# Patient Record
Sex: FEMALE | Race: WHITE | NOT HISPANIC OR LATINO | Employment: OTHER | URBAN - METROPOLITAN AREA
[De-identification: names, ages, dates, MRNs, and addresses within clinical notes are randomized per-mention and may not be internally consistent; named-entity substitution may affect disease eponyms.]

---

## 2023-03-04 PROCEDURE — 99308 SBSQ NF CARE LOW MDM 20: CPT | Performed by: INTERNAL MEDICINE

## 2023-03-07 ENCOUNTER — NURSING HOME VISIT (OUTPATIENT)
Dept: POST ACUTE CARE | Facility: EXTERNAL LOCATION | Age: 78
End: 2023-03-07
Payer: MEDICARE

## 2023-03-07 DIAGNOSIS — K21.9 GASTROESOPHAGEAL REFLUX DISEASE, UNSPECIFIED WHETHER ESOPHAGITIS PRESENT: ICD-10-CM

## 2023-03-07 DIAGNOSIS — I10 HYPERTENSION, UNSPECIFIED TYPE: ICD-10-CM

## 2023-03-07 DIAGNOSIS — E56.9 VITAMIN DEFICIENCY: ICD-10-CM

## 2023-03-07 DIAGNOSIS — F02.80 ALZHEIMER'S DEMENTIA WITHOUT BEHAVIORAL DISTURBANCE, PSYCHOTIC DISTURBANCE, MOOD DISTURBANCE, OR ANXIETY, UNSPECIFIED DEMENTIA SEVERITY, UNSPECIFIED TIMING OF DEMENTIA ONSET (MULTI): ICD-10-CM

## 2023-03-07 DIAGNOSIS — N32.81 OAB (OVERACTIVE BLADDER): ICD-10-CM

## 2023-03-07 DIAGNOSIS — M62.81 MUSCLE WEAKNESS (GENERALIZED): Primary | ICD-10-CM

## 2023-03-07 DIAGNOSIS — N28.9 RENAL INSUFFICIENCY: ICD-10-CM

## 2023-03-07 DIAGNOSIS — G30.9 ALZHEIMER'S DEMENTIA WITHOUT BEHAVIORAL DISTURBANCE, PSYCHOTIC DISTURBANCE, MOOD DISTURBANCE, OR ANXIETY, UNSPECIFIED DEMENTIA SEVERITY, UNSPECIFIED TIMING OF DEMENTIA ONSET (MULTI): ICD-10-CM

## 2023-03-07 DIAGNOSIS — I31.9 PERICARDITIS, UNSPECIFIED CHRONICITY, UNSPECIFIED TYPE (HHS-HCC): ICD-10-CM

## 2023-03-07 DIAGNOSIS — D64.9 ANEMIA, UNSPECIFIED TYPE: ICD-10-CM

## 2023-03-07 PROCEDURE — 99309 SBSQ NF CARE MODERATE MDM 30: CPT | Performed by: NURSE PRACTITIONER

## 2023-03-07 NOTE — LETTER
*Provider Impression*  Patient is a 77 year old female who is seen today for management of multiple medical problems  #Weakness - PT/OT, acetaminophen 650mg q4h PRN, gabapentin 100mg TID, d/c ibuprofen, start APAP 650mg BID  #HTN / Pericarditis / Renal insufficiency - losartan 50mg daily, furosemide 40mg daily, KCl 20mEq daily, ibuprofen 600mg TID, f/u w/ cardiology,  check CBC and renal on 3/10  #OAB - oxybutynin XL 10mg daily  #GERD - omeprazole 20mg daily  #Dementia - memantine 10mg BID, donepezil 10mg daily  #Anemia / Vitamin deficiency - folic acid 800mcg daily, ferrous sulfate 325mg BID, ascorbic acid 250mg BID  Follow up as needed      *Chief Complaint*  PNA, pericarditis      *History of Present Illness*  Pt is a 78 y/o female who presented to [facility] ED from home. She was recently discharged from [facility] after being treated for pericarditis and Community Acquired Pneumonia with Levaquin. Patient reported she adhered to discharge plan of care, but had 7 episodes of diarrhea for several days. Labs showed Potassium 3.1 Magnesium 1.27. In ED Patient received Potassium and Magnesium Repeat labs in AM. CT of Abdomen showed New left lower lobe consolidation compatible with pneumonia. Suspected small left parapneumonic effusion In ED started on Levaquin. Her C. diff and stool PCR were negative. She was seen by cardiology for pericarditis. She was determined to be stable and d/c to [facility] for rehab.    Her renal was function lower. She had f/u w/ cardiollogy.    She is seen sitting up in her room today and denies any f/c, sweats, still having pains, occ cough, occ SOB, all c/w her pericarditis symptoms, no worse. No n/v, cosntipation, diarrhea, LUTS, edema, or any other new c/o presently.     Allergies - Cortisone, Amoxil, Inderal, Sulfa Antibiotics, Contrast Dye  PMH - bronchitis, pharyngitis, Alzheimer's dementia, Bell's palsy, HTN, cellulitis, GERD, CVA, HLD, hypothyroidism, morbid obesity, TIA,  vitamin D deficiency, breast cancer  PSH - mastectomy, excision of lesion, shoulder surgery, tubal ligation  FH - cancer, heart disease, HTN, pulmonary disease  SocHx - Former smoker, EtOH use      *Review of Systems*  All other systems reviewed are negative except as noted in the HPI     *Vitals*  Date: 3/7/23 - T: 97.5  P: 74  R: 18  BP: 132/74  SpO2: 99% on RA ; Date: 23 - Wt: 208     *Results/Data*  CBC - Date: 23 WBC: 9.3 HGB: 9.9 HCT: 30.4 PLT: 526 ;   BMP - Date: 3/2/23 Na: 146 K: 3.5 Cl: 100 CO2: 30 BUN: 37 Cr: 1.7 Glu: 90 Ca: 9.0 ;   LFT - Date: 23 AST: 25 ALT: 47 ALP: 55 TBili: 0.5 Ma.7 ;   Coags - Date: INR: PT: ;   Other - Date: 23 - TSH: 0.872 25-OH-D: 38.34     *Physical Exam*  Gen: (+) NAD, (+) well-appearing  HEENT: (+) normocephalic, (+) MMM  Neck: (+) supple  Lungs: (+) CTAB, (-) wheezes, (-) rales, (-) rhonchi  Heart: (+) RRR, (+) S1 S2, (-) murmurs  Pulses: (+) palpable  Abd: (+) soft, (+) NT, (+) ND, (+) BS+  Ext: (-) edema, (-) deformity  MSK: (-) joint swelling  Skin: (+) warm, (+) dry, (-) rash  Neuro: (+) follows commands, (-) tremor, (+) alert

## 2023-03-08 NOTE — PROGRESS NOTES
*Provider Impression*  Patient is a 77 year old female who is seen today for management of multiple medical problems  #Weakness - PT/OT, acetaminophen 650mg q4h PRN, gabapentin 100mg TID, d/c ibuprofen, start APAP 650mg BID  #HTN / Pericarditis / Renal insufficiency - losartan 50mg daily, furosemide 40mg daily, KCl 20mEq daily, ibuprofen 600mg TID, f/u w/ cardiology,  check CBC and renal on 3/10  #OAB - oxybutynin XL 10mg daily  #GERD - omeprazole 20mg daily  #Dementia - memantine 10mg BID, donepezil 10mg daily  #Anemia / Vitamin deficiency - folic acid 800mcg daily, ferrous sulfate 325mg BID, ascorbic acid 250mg BID  Follow up as needed      *Chief Complaint*  PNA, pericarditis      *History of Present Illness*  Pt is a 76 y/o female who presented to [facility] ED from home. She was recently discharged from [facility] after being treated for pericarditis and Community Acquired Pneumonia with Levaquin. Patient reported she adhered to discharge plan of care, but had 7 episodes of diarrhea for several days. Labs showed Potassium 3.1 Magnesium 1.27. In ED Patient received Potassium and Magnesium Repeat labs in AM. CT of Abdomen showed New left lower lobe consolidation compatible with pneumonia. Suspected small left parapneumonic effusion In ED started on Levaquin. Her C. diff and stool PCR were negative. She was seen by cardiology for pericarditis. She was determined to be stable and d/c to [facility] for rehab.    Her renal was function lower. She had f/u w/ cardiollogy.    She is seen sitting up in her room today and denies any f/c, sweats, still having pains, occ cough, occ SOB, all c/w her pericarditis symptoms, no worse. No n/v, cosntipation, diarrhea, LUTS, edema, or any other new c/o presently.     Allergies - Cortisone, Amoxil, Inderal, Sulfa Antibiotics, Contrast Dye  PMH - bronchitis, pharyngitis, Alzheimer's dementia, Bell's palsy, HTN, cellulitis, GERD, CVA, HLD, hypothyroidism, morbid obesity, TIA,  vitamin D deficiency, breast cancer  PSH - mastectomy, excision of lesion, shoulder surgery, tubal ligation  FH - cancer, heart disease, HTN, pulmonary disease  SocHx - Former smoker, EtOH use      *Review of Systems*  All other systems reviewed are negative except as noted in the HPI     *Vitals*  Date: 3/7/23 - T: 97.5  P: 74  R: 18  BP: 132/74  SpO2: 99% on RA ; Date: 23 - Wt: 208     *Results/Data*  CBC - Date: 23 WBC: 9.3 HGB: 9.9 HCT: 30.4 PLT: 526 ;   BMP - Date: 3/2/23 Na: 146 K: 3.5 Cl: 100 CO2: 30 BUN: 37 Cr: 1.7 Glu: 90 Ca: 9.0 ;   LFT - Date: 23 AST: 25 ALT: 47 ALP: 55 TBili: 0.5 Ma.7 ;   Coags - Date: INR: PT: ;   Other - Date: 23 - TSH: 0.872 25-OH-D: 38.34     *Physical Exam*  Gen: (+) NAD, (+) well-appearing  HEENT: (+) normocephalic, (+) MMM  Neck: (+) supple  Lungs: (+) CTAB, (-) wheezes, (-) rales, (-) rhonchi  Heart: (+) RRR, (+) S1 S2, (-) murmurs  Pulses: (+) palpable  Abd: (+) soft, (+) NT, (+) ND, (+) BS+  Ext: (-) edema, (-) deformity  MSK: (-) joint swelling  Skin: (+) warm, (+) dry, (-) rash  Neuro: (+) follows commands, (-) tremor, (+) alert

## 2023-03-12 PROCEDURE — 99309 SBSQ NF CARE MODERATE MDM 30: CPT | Performed by: INTERNAL MEDICINE

## 2023-03-14 ENCOUNTER — NURSING HOME VISIT (OUTPATIENT)
Dept: POST ACUTE CARE | Facility: EXTERNAL LOCATION | Age: 78
End: 2023-03-14
Payer: MEDICARE

## 2023-03-14 DIAGNOSIS — I31.9 PERICARDITIS, UNSPECIFIED CHRONICITY, UNSPECIFIED TYPE (HHS-HCC): ICD-10-CM

## 2023-03-14 DIAGNOSIS — N32.81 OAB (OVERACTIVE BLADDER): ICD-10-CM

## 2023-03-14 DIAGNOSIS — M62.81 MUSCLE WEAKNESS (GENERALIZED): Primary | ICD-10-CM

## 2023-03-14 DIAGNOSIS — K21.9 GASTROESOPHAGEAL REFLUX DISEASE, UNSPECIFIED WHETHER ESOPHAGITIS PRESENT: ICD-10-CM

## 2023-03-14 DIAGNOSIS — E56.9 VITAMIN DEFICIENCY: ICD-10-CM

## 2023-03-14 DIAGNOSIS — J18.9 HCAP (HEALTHCARE-ASSOCIATED PNEUMONIA): ICD-10-CM

## 2023-03-14 DIAGNOSIS — F02.80 ALZHEIMER'S DEMENTIA WITHOUT BEHAVIORAL DISTURBANCE, PSYCHOTIC DISTURBANCE, MOOD DISTURBANCE, OR ANXIETY, UNSPECIFIED DEMENTIA SEVERITY, UNSPECIFIED TIMING OF DEMENTIA ONSET (MULTI): ICD-10-CM

## 2023-03-14 DIAGNOSIS — N28.9 RENAL INSUFFICIENCY: ICD-10-CM

## 2023-03-14 DIAGNOSIS — D64.9 ANEMIA, UNSPECIFIED TYPE: ICD-10-CM

## 2023-03-14 DIAGNOSIS — G30.9 ALZHEIMER'S DEMENTIA WITHOUT BEHAVIORAL DISTURBANCE, PSYCHOTIC DISTURBANCE, MOOD DISTURBANCE, OR ANXIETY, UNSPECIFIED DEMENTIA SEVERITY, UNSPECIFIED TIMING OF DEMENTIA ONSET (MULTI): ICD-10-CM

## 2023-03-14 DIAGNOSIS — I10 HYPERTENSION, UNSPECIFIED TYPE: ICD-10-CM

## 2023-03-14 PROCEDURE — 99309 SBSQ NF CARE MODERATE MDM 30: CPT | Performed by: NURSE PRACTITIONER

## 2023-03-15 NOTE — PROGRESS NOTES
*Provider Impression*  Patient is a 77 year old female who is seen today for management of multiple medical problems  #Weakness - PT/OT, acetaminophen 650mg q4h PRN, gabapentin 100mg TID, APAP 650mg BID  #HCAP - doxycyline 100mg BID until 3/20  #HTN / Pericarditis / Renal insufficiency - losartan 50mg daily, furosemide 40mg BID, KCl 20mEq daily, f/u w/ cardiology  #OAB - oxybutynin XL 10mg daily  #GERD - omeprazole 20mg daily  #Dementia - memantine 10mg BID, donepezil 10mg daily  #Anemia / Vitamin deficiency - folic acid 800mcg daily, ferrous sulfate 325mg BID, ascorbic acid 250mg BID  Follow up as needed      *Chief Complaint*  PNA, pericarditis      *History of Present Illness*  Pt is a 76 y/o female who presented to [facility] ED from home. She was recently discharged from [facility] after being treated for pericarditis and Community Acquired Pneumonia with Levaquin. Patient reported she adhered to discharge plan of care, but had 7 episodes of diarrhea for several days. Labs showed Potassium 3.1 Magnesium 1.27. In ED Patient received Potassium and Magnesium Repeat labs in AM. CT of Abdomen showed New left lower lobe consolidation compatible with pneumonia. Suspected small left parapneumonic effusion In ED started on Levaquin. Her C. diff and stool PCR were negative. She was seen by cardiology for pericarditis. She was determined to be stable and d/c to [facility] for rehab.     Her CXR showed minimal left basilar atelectasis w/ effusion, and she was started on doxycycline and lasix increased.  Her labs were ok.     She is seen sitting up in her room today and reports therapy going ok, no pain, no f/c, sweats, CP, SOB, cough, n/v, constipation, diarrhea,  or any other new c/o presently.      Allergies - Cortisone, Amoxil, Inderal, Sulfa Antibiotics, Contrast Dye  PMH - bronchitis, pharyngitis, Alzheimer's dementia, Bell's palsy, HTN, cellulitis, GERD, CVA, HLD, hypothyroidism, morbid obesity, TIA, vitamin D  deficiency, breast cancer  PSH - mastectomy, excision of lesion, shoulder surgery, tubal ligation  FH - cancer, heart disease, HTN, pulmonary disease  SocHx - Former smoker, EtOH use      *Review of Systems*  All other systems reviewed are negative except as noted in the HPI      *Vitals*  Date: 3/14/23 - T: 97.7  P: 78  R: 18  BP: 128/62  SpO2: 98% on RA ; Date: 3/14/23 - Wt: 209      *Results/Data*  CBC - Date: 3/10/23 WBC: 8.0 HGB: 9.9 HCT: 30.2 PLT: 382 ;   BMP - Date: 3/10/23 Na: 145 K: 3.8 Cl: 102 CO2: 30 BUN: 24 Cr: 1.0 Glu: 84 Ca: 9.0  Alb: 2.9;   LFT - Date: 23 AST: 25 ALT: 47 ALP: 55 TBili: 0.5 Ma.7 ;   Coags - Date: INR: PT: ;   Other - Date: 23 - TSH: 0.872 25-OH-D: 38.34     *Physical Exam*  Gen: (+) NAD, (+) well-appearing  HEENT: (+) normocephalic, (+) MMM  Neck: (+) supple  Lungs: (+) CTAB, (-) wheezes, (-) rales, (-) rhonchi  Heart: (+) RRR, (+) S1 S2, (-) murmurs  Pulses: (+) palpable  Abd: (+) soft, (+) NT, (+) ND, (+) BS+  Ext: (-) edema, (-) deformity  MSK: (-) joint swelling  Skin: (+) warm, (+) dry, (-) rash  Neuro: (+) follows commands, (-) tremor, (+) alert

## 2023-04-20 LAB
ANION GAP IN SER/PLAS: NORMAL
CALCIUM (MG/DL) IN SER/PLAS: NORMAL
CARBON DIOXIDE, TOTAL (MMOL/L) IN SER/PLAS: NORMAL
CHLORIDE (MMOL/L) IN SER/PLAS: NORMAL
CREATININE (MG/DL) IN SER/PLAS: NORMAL
GFR FEMALE: NORMAL
GFR MALE: NORMAL
GLUCOSE (MG/DL) IN SER/PLAS: NORMAL
NATRIURETIC PEPTIDE B (PG/ML) IN SER/PLAS: NORMAL
POTASSIUM (MMOL/L) IN SER/PLAS: NORMAL
SODIUM (MMOL/L) IN SER/PLAS: NORMAL
UREA NITROGEN (MG/DL) IN SER/PLAS: NORMAL

## 2023-04-21 LAB
ALANINE AMINOTRANSFERASE (SGPT) (U/L) IN SER/PLAS: 8 U/L (ref 7–45)
ALBUMIN (G/DL) IN SER/PLAS: 4.3 G/DL (ref 3.4–5)
ALKALINE PHOSPHATASE (U/L) IN SER/PLAS: 45 U/L (ref 33–136)
ANION GAP IN SER/PLAS: 15 MMOL/L (ref 10–20)
ASPARTATE AMINOTRANSFERASE (SGOT) (U/L) IN SER/PLAS: 16 U/L (ref 9–39)
BILIRUBIN TOTAL (MG/DL) IN SER/PLAS: 0.4 MG/DL (ref 0–1.2)
CALCIUM (MG/DL) IN SER/PLAS: 9.8 MG/DL (ref 8.6–10.3)
CARBON DIOXIDE, TOTAL (MMOL/L) IN SER/PLAS: 28 MMOL/L (ref 21–32)
CHLORIDE (MMOL/L) IN SER/PLAS: 99 MMOL/L (ref 98–107)
CREATININE (MG/DL) IN SER/PLAS: 1.14 MG/DL (ref 0.5–1.05)
ERYTHROCYTE DISTRIBUTION WIDTH (RATIO) BY AUTOMATED COUNT: 15.7 % (ref 11.5–14.5)
ERYTHROCYTE MEAN CORPUSCULAR HEMOGLOBIN CONCENTRATION (G/DL) BY AUTOMATED: 30.1 G/DL (ref 32–36)
ERYTHROCYTE MEAN CORPUSCULAR VOLUME (FL) BY AUTOMATED COUNT: 99 FL (ref 80–100)
ERYTHROCYTES (10*6/UL) IN BLOOD BY AUTOMATED COUNT: 3.9 X10E12/L (ref 4–5.2)
GFR FEMALE: 49 ML/MIN/1.73M2
GLUCOSE (MG/DL) IN SER/PLAS: 102 MG/DL (ref 74–99)
HEMATOCRIT (%) IN BLOOD BY AUTOMATED COUNT: 38.5 % (ref 36–46)
HEMOGLOBIN (G/DL) IN BLOOD: 11.6 G/DL (ref 12–16)
LEUKOCYTES (10*3/UL) IN BLOOD BY AUTOMATED COUNT: 7.6 X10E9/L (ref 4.4–11.3)
NATRIURETIC PEPTIDE B (PG/ML) IN SER/PLAS: 26 PG/ML (ref 0–99)
PLATELETS (10*3/UL) IN BLOOD AUTOMATED COUNT: 367 X10E9/L (ref 150–450)
POTASSIUM (MMOL/L) IN SER/PLAS: 4.1 MMOL/L (ref 3.5–5.3)
PROTEIN TOTAL: 7.3 G/DL (ref 6.4–8.2)
SODIUM (MMOL/L) IN SER/PLAS: 138 MMOL/L (ref 136–145)
UREA NITROGEN (MG/DL) IN SER/PLAS: 30 MG/DL (ref 6–23)

## 2023-11-07 ENCOUNTER — TELEPHONE (OUTPATIENT)
Dept: PRIMARY CARE | Facility: CLINIC | Age: 78
End: 2023-11-07
Payer: MEDICARE

## 2023-11-07 PROBLEM — E55.9 VITAMIN D DEFICIENCY: Status: ACTIVE | Noted: 2023-11-07

## 2023-11-07 PROBLEM — N32.81 OAB (OVERACTIVE BLADDER): Status: ACTIVE | Noted: 2023-11-07

## 2023-11-07 PROBLEM — E03.9 ACQUIRED HYPOTHYROIDISM: Status: ACTIVE | Noted: 2023-11-07

## 2023-11-07 PROBLEM — F51.01 PRIMARY INSOMNIA: Status: ACTIVE | Noted: 2023-11-07

## 2023-11-07 PROBLEM — K21.9 GASTROESOPHAGEAL REFLUX DISEASE: Status: ACTIVE | Noted: 2023-11-07

## 2023-11-07 PROBLEM — E78.5 HYPERLIPIDEMIA: Status: ACTIVE | Noted: 2023-11-07

## 2023-11-07 PROBLEM — I73.9 PERIPHERAL VASCULAR DISEASE (CMS-HCC): Status: ACTIVE | Noted: 2023-11-07

## 2023-11-07 PROBLEM — D50.9 IRON DEFICIENCY ANEMIA: Status: ACTIVE | Noted: 2023-11-07

## 2023-11-07 PROBLEM — R41.82 ALTERED MENTAL STATUS: Status: ACTIVE | Noted: 2023-11-07

## 2023-11-07 PROBLEM — I10 PRIMARY HYPERTENSION: Status: ACTIVE | Noted: 2023-11-07

## 2023-11-07 PROBLEM — I89.0 LYMPHEDEMA: Status: ACTIVE | Noted: 2023-11-07

## 2023-11-07 PROBLEM — G62.9 NEUROPATHY: Status: ACTIVE | Noted: 2023-11-07

## 2023-11-07 PROBLEM — F01.50 VASCULAR DEMENTIA WITHOUT BEHAVIORAL DISTURBANCE (MULTI): Status: ACTIVE | Noted: 2023-11-07

## 2023-11-07 RX ORDER — ACETAMINOPHEN 325 MG/1
650 TABLET ORAL EVERY 4 HOURS PRN
COMMUNITY
Start: 2021-12-15 | End: 2023-11-08 | Stop reason: WASHOUT

## 2023-11-07 RX ORDER — OMEPRAZOLE 20 MG/1
20 CAPSULE, DELAYED RELEASE ORAL
COMMUNITY
Start: 2014-02-05 | End: 2024-02-19

## 2023-11-07 RX ORDER — MULTIVIT WITH MINERALS/HERBS
1 TABLET ORAL DAILY
COMMUNITY
Start: 2022-08-04

## 2023-11-07 RX ORDER — POTASSIUM CHLORIDE 20 MEQ/1
20 TABLET, EXTENDED RELEASE ORAL DAILY
COMMUNITY
Start: 2023-10-23 | End: 2024-06-10

## 2023-11-07 RX ORDER — FOLIC ACID 0.8 MG
0.8 TABLET ORAL DAILY
COMMUNITY
Start: 2014-01-21

## 2023-11-07 RX ORDER — BROMPHENIRAMINE MALEATE, DEXTROMETHORPHAN HBR, PHENYLEPHRINE HCL, DIPHENHYDRAMINE HCL, PHENYLEPHRINE HCL 0.52G
3 KIT ORAL DAILY
COMMUNITY
Start: 2022-06-28 | End: 2024-01-11 | Stop reason: SDUPTHER

## 2023-11-07 RX ORDER — FERROUS SULFATE 325(65) MG
65 TABLET ORAL
COMMUNITY
Start: 2022-08-04 | End: 2024-02-19

## 2023-11-07 RX ORDER — DONEPEZIL HYDROCHLORIDE 10 MG/1
10 TABLET, FILM COATED ORAL NIGHTLY
COMMUNITY
Start: 2019-03-26

## 2023-11-07 RX ORDER — ROSUVASTATIN CALCIUM 10 MG/1
10 TABLET, COATED ORAL DAILY
COMMUNITY
Start: 2016-09-12 | End: 2024-02-19

## 2023-11-07 RX ORDER — ACETAMINOPHEN 500 MG
2000 TABLET ORAL DAILY
COMMUNITY
Start: 2022-08-04

## 2023-11-07 RX ORDER — ASCORBIC ACID 500 MG
500 TABLET ORAL DAILY
COMMUNITY
Start: 2022-08-04

## 2023-11-07 RX ORDER — DEXTROMETHORPHAN HYDROBROMIDE, GUAIFENESIN 5; 100 MG/5ML; MG/5ML
1300 LIQUID ORAL 2 TIMES DAILY PRN
COMMUNITY
Start: 2022-04-15 | End: 2023-12-20 | Stop reason: SDUPTHER

## 2023-11-07 RX ORDER — FUROSEMIDE 40 MG/1
40 TABLET ORAL 2 TIMES DAILY
COMMUNITY
Start: 2021-04-16 | End: 2024-06-10

## 2023-11-07 RX ORDER — QUETIAPINE FUMARATE 25 MG/1
0.5 TABLET, FILM COATED ORAL NIGHTLY
COMMUNITY
Start: 2022-11-11

## 2023-11-07 RX ORDER — MEMANTINE HYDROCHLORIDE 10 MG/1
10 TABLET ORAL 2 TIMES DAILY
COMMUNITY
Start: 2019-03-26

## 2023-11-07 RX ORDER — LOSARTAN POTASSIUM 50 MG/1
50 TABLET ORAL DAILY
COMMUNITY
Start: 2021-12-15

## 2023-11-08 ENCOUNTER — OFFICE VISIT (OUTPATIENT)
Dept: PRIMARY CARE | Facility: CLINIC | Age: 78
End: 2023-11-08
Payer: MEDICARE

## 2023-11-08 VITALS
DIASTOLIC BLOOD PRESSURE: 78 MMHG | WEIGHT: 204 LBS | BODY MASS INDEX: 37.54 KG/M2 | RESPIRATION RATE: 16 BRPM | OXYGEN SATURATION: 97 % | TEMPERATURE: 97.1 F | HEART RATE: 72 BPM | HEIGHT: 62 IN | SYSTOLIC BLOOD PRESSURE: 116 MMHG

## 2023-11-08 DIAGNOSIS — K21.9 GASTROESOPHAGEAL REFLUX DISEASE WITHOUT ESOPHAGITIS: ICD-10-CM

## 2023-11-08 DIAGNOSIS — G30.0 MODERATE EARLY ONSET ALZHEIMER'S DEMENTIA WITH MOOD DISTURBANCE (MULTI): ICD-10-CM

## 2023-11-08 DIAGNOSIS — I89.0 LYMPHEDEMA: ICD-10-CM

## 2023-11-08 DIAGNOSIS — F02.B3 MODERATE EARLY ONSET ALZHEIMER'S DEMENTIA WITH MOOD DISTURBANCE (MULTI): ICD-10-CM

## 2023-11-08 DIAGNOSIS — I10 PRIMARY HYPERTENSION: Primary | ICD-10-CM

## 2023-11-08 DIAGNOSIS — R60.0 BILATERAL EDEMA OF LOWER EXTREMITY: ICD-10-CM

## 2023-11-08 DIAGNOSIS — E03.9 ACQUIRED HYPOTHYROIDISM: ICD-10-CM

## 2023-11-08 PROCEDURE — 1126F AMNT PAIN NOTED NONE PRSNT: CPT | Performed by: NURSE PRACTITIONER

## 2023-11-08 PROCEDURE — 3078F DIAST BP <80 MM HG: CPT | Performed by: NURSE PRACTITIONER

## 2023-11-08 PROCEDURE — 1159F MED LIST DOCD IN RCRD: CPT | Performed by: NURSE PRACTITIONER

## 2023-11-08 PROCEDURE — 1160F RVW MEDS BY RX/DR IN RCRD: CPT | Performed by: NURSE PRACTITIONER

## 2023-11-08 PROCEDURE — 3074F SYST BP LT 130 MM HG: CPT | Performed by: NURSE PRACTITIONER

## 2023-11-08 PROCEDURE — 99349 HOME/RES VST EST MOD MDM 40: CPT | Performed by: NURSE PRACTITIONER

## 2023-11-08 PROCEDURE — 1036F TOBACCO NON-USER: CPT | Performed by: NURSE PRACTITIONER

## 2023-11-08 ASSESSMENT — ENCOUNTER SYMPTOMS
UNEXPECTED WEIGHT CHANGE: 0
APPETITE CHANGE: 0
COUGH: 1
NERVOUS/ANXIOUS: 0
DEPRESSION: 0
LOSS OF SENSATION IN FEET: 0
ENDOCRINE COMMENTS: POSITIVE FOR THYROID DISEASE
NAUSEA: 0
PALPITATIONS: 0
CHILLS: 0
VOMITING: 0
SHORTNESS OF BREATH: 0
OCCASIONAL FEELINGS OF UNSTEADINESS: 0
DIARRHEA: 0
TROUBLE SWALLOWING: 0
WHEEZING: 0
FEVER: 0
CONSTIPATION: 0
ABDOMINAL PAIN: 0

## 2023-11-08 ASSESSMENT — PAIN SCALES - GENERAL: PAINLEVEL: 0-NO PAIN

## 2023-11-08 NOTE — PROGRESS NOTES
"Subjective   Patient ID: Aleta Feliz is a 78 y.o. female who presents for Follow-up (HTN, Lymphedema, function).    Visit for 77 y/o female seen today in private home, accompanied by son Reynaldo and caregiver Linda for routine follow up of multiple medical issues. Patient is sitting in w/c in kitchen this afternoon. She is alert, oriented to time and place, disoriented to time. She has h/o dementia and is an overall poor historian regarding her health. Her son and caregiver supplement HPI as needed. Patient lives with her son Reynaldo and has private duty caregivers from 10am-4pm daily and then from 6pm-9pm every evening. Her caregivers assist with all ADL and meal preparation. She recieves all medications from [facility] in pill packs. Her son/caregivers make sure that she takes her medications daily. Patient ambulates short distances with her walker. She denies any recent falls or injury. She denies appetite changes, signs of weight loss. Denies abdominal pain, nausea, vomiting. She denies any bowel or bladder concerns. She is incontinent of urine. She denies difficulty sleeping. H/o HTN. Pt does not routinely monitor her vitals. Her cardiologist is Dr. Edwards. She was scheduled to have a follow up last Friday but canceled the appt because she went to have her 2D echo and the tech was unable to complete the test as patient was not cooperative during the test and kept pushing the tech away as she states \"I was in pain\". h/o lymphedema. She does not use her lymphedema pumps. She reports her edema to be stable. She does try and elevate her legs throughout the day. Patients caregiver and son report that she has been doing very well. They deny any acute concerns today.     Home Visit:          Medically necessary due to: Illness or condition that results in activity lmitation or restriction that impacts the ability to leave home such as:, unsteady gait/poor balance, dementia/cognitive impairment         Current " Outpatient Medications:     acetaminophen (Tylenol 8 HOUR) 650 mg ER tablet, Take 2 tablets (1,300 mg) by mouth 2 times a day as needed., Disp: , Rfl:     ascorbic acid (Vitamin C) 500 mg tablet, Take 1 tablet (500 mg) by mouth once daily., Disp: , Rfl:     cholecalciferol (Vitamin D-3) 50 mcg (2,000 unit) capsule, Take 1 capsule (2,000 Units) by mouth early in the morning.., Disp: , Rfl:     cranberry fruit concentrate (AZO CRANBERRY ORAL), Take 1 tablet by mouth once daily in the evening., Disp: , Rfl:     donepezil (Aricept) 10 mg tablet, Take 1 tablet (10 mg) by mouth once daily at bedtime., Disp: , Rfl:     ferrous sulfate 325 (65 Fe) MG tablet, Take 1 tablet (65 mg of iron) by mouth. Three times a week, Disp: , Rfl:     folic acid (Folvite) 800 mcg tablet, Take 1 tablet (0.8 mg) by mouth once daily., Disp: , Rfl:     furosemide (Lasix) 40 mg tablet, Take 1 tablet (40 mg) by mouth 2 times a day., Disp: , Rfl:     levothyroxine (Synthroid, Levoxyl) 50 mcg tablet, Take 1 tablet (50 mcg) by mouth once daily in the morning. Take before meals., Disp: , Rfl:     losartan (Cozaar) 50 mg tablet, Take 1 tablet (50 mg) by mouth once daily., Disp: , Rfl:     memantine (Namenda) 10 mg tablet, Take 1 tablet (10 mg) by mouth 2 times a day., Disp: , Rfl:     omeprazole (PriLOSEC) 20 mg DR capsule, Take 1 capsule (20 mg) by mouth once daily in the morning. Take before meals., Disp: , Rfl:     oxybutynin XL (Ditropan-XL) 10 mg 24 hr tablet, Take 1 tablet (10 mg) by mouth once every 24 hours., Disp: , Rfl:     potassium chloride CR 20 mEq ER tablet, Take 1 tablet (20 mEq) by mouth once daily. With food, Disp: , Rfl:     psyllium (Metamucil) 0.52 gram capsule, Take 3 capsules (1.56 g) by mouth once daily. With 8 ounces of liquid, Disp: , Rfl:     QUEtiapine (SEROquel) 25 mg tablet, Take 0.5 tablets (12.5 mg) by mouth once daily at bedtime., Disp: , Rfl:     rosuvastatin (Crestor) 10 mg tablet, Take 1 tablet (10 mg) by mouth once  "daily., Disp: , Rfl:     vitamin B complex (Vitamins B Complex) tablet, Take 1 tablet by mouth once daily., Disp: , Rfl:      Review of Systems   Constitutional:  Negative for appetite change, chills, fever and unexpected weight change.   HENT:  Negative for hearing loss and trouble swallowing.    Respiratory:  Positive for cough (in the mornings). Negative for shortness of breath and wheezing.    Cardiovascular:  Positive for leg swelling. Negative for chest pain and palpitations.   Gastrointestinal:  Negative for abdominal pain, constipation, diarrhea, nausea and vomiting.   Endocrine:        Positive for thyroid disease   Genitourinary:         Positive for overactive bladder, urinary incontinence   Musculoskeletal:  Positive for gait problem.   Neurological:         Positive for memory loss   Psychiatric/Behavioral:  The patient is not nervous/anxious.      Objective   /78 (BP Location: Left arm, Patient Position: Sitting, BP Cuff Size: Large adult)   Pulse 72   Temp 36.2 °C (97.1 °F) (Temporal)   Resp 16   Ht 1.575 m (5' 2\")   Wt 92.5 kg (204 lb)   SpO2 97%   BMI 37.31 kg/m²     Physical Exam  Constitutional:       General: She is not in acute distress.     Appearance: She is obese.      Comments: Sitting in w/c with legs dependent   HENT:      Head: Normocephalic and atraumatic.      Nose: Nose normal.      Mouth/Throat:      Mouth: Mucous membranes are moist.      Pharynx: Oropharynx is clear.   Eyes:      Pupils: Pupils are equal, round, and reactive to light.      Comments: Wearing glasses    Cardiovascular:      Rate and Rhythm: Normal rate and regular rhythm.      Pulses: Normal pulses.      Heart sounds: Normal heart sounds. No murmur heard.     No friction rub. No gallop.      Comments: Trace edema bilaterally, non pitting  Pulmonary:      Effort: Pulmonary effort is normal. No respiratory distress.      Breath sounds: Normal breath sounds. No wheezing, rhonchi or rales.   Abdominal:      " General: Bowel sounds are normal. There is no distension.      Palpations: Abdomen is soft.      Tenderness: There is no abdominal tenderness.   Musculoskeletal:      Cervical back: Neck supple.      Comments: Ambulatory with walker   Skin:     General: Skin is warm and dry.      Capillary Refill: Capillary refill takes less than 2 seconds.      Comments: Stasis dermatitis bilaterally   Neurological:      General: No focal deficit present.      Mental Status: She is alert. Mental status is at baseline.      Gait: Gait abnormal.      Comments: Disoriented to time   Psychiatric:         Mood and Affect: Mood normal.         Behavior: Behavior normal.       Assessment/Plan   Diagnoses and all orders for this visit:  Primary hypertension  Comments:  chronic, vitals stable, continue Losartan  Bilateral edema of lower extremity  Comments:  chronic, stable, continue Furosemide  Lymphedema  Comments:  chronic, stable, recommend routine use of lymphedema pumps  Gastroesophageal reflux disease without esophagitis  Comments:  chronic, stable, continue with Omeprazole  Acquired hypothyroidism  Comments:  chronic, stable, continue Levothyroxine  Moderate early onset Alzheimer's dementia with mood disturbance (CMS/HCC)  Comments:  chronic, memory impairment noted. Continue Namenda, Aricept. Continue Seroquel for behaviors.       Theresa Sanders, APRN-CNP

## 2023-11-09 PROBLEM — I44.7 LEFT BUNDLE-BRANCH BLOCK: Status: ACTIVE | Noted: 2023-11-09

## 2023-11-09 PROBLEM — L03.116 CELLULITIS OF LEFT LOWER EXTREMITY WITHOUT FOOT: Status: ACTIVE | Noted: 2023-11-09

## 2023-11-09 PROBLEM — R73.9 HYPERGLYCEMIA: Status: ACTIVE | Noted: 2023-11-09

## 2023-11-09 PROBLEM — F02.80 ALZHEIMER'S DEMENTIA (MULTI): Status: ACTIVE | Noted: 2023-11-09

## 2023-11-09 PROBLEM — K59.00 CONSTIPATION: Status: ACTIVE | Noted: 2023-11-09

## 2023-11-09 PROBLEM — R60.0 BILATERAL EDEMA OF LOWER EXTREMITY: Status: ACTIVE | Noted: 2023-11-09

## 2023-11-09 PROBLEM — Z86.73 HISTORY OF STROKE: Status: ACTIVE | Noted: 2023-11-09

## 2023-11-09 PROBLEM — G30.9 ALZHEIMER'S DEMENTIA (MULTI): Status: ACTIVE | Noted: 2023-11-09

## 2023-11-09 PROBLEM — M81.0 AGE RELATED OSTEOPOROSIS: Status: ACTIVE | Noted: 2023-11-09

## 2023-11-09 PROBLEM — F32.A DEPRESSION: Status: ACTIVE | Noted: 2023-11-09

## 2023-11-09 PROBLEM — G45.9 TIA (TRANSIENT ISCHEMIC ATTACK): Status: ACTIVE | Noted: 2023-11-09

## 2023-11-09 PROBLEM — R19.5 FECAL OCCULT BLOOD TEST POSITIVE: Status: ACTIVE | Noted: 2023-11-09

## 2023-11-09 PROBLEM — R32 URINARY INCONTINENCE: Status: ACTIVE | Noted: 2023-11-09

## 2023-11-09 PROBLEM — E66.01 MORBID OBESITY (MULTI): Status: ACTIVE | Noted: 2023-11-09

## 2023-11-09 PROBLEM — G51.0 BELL'S PALSY: Status: ACTIVE | Noted: 2023-11-09

## 2023-11-22 DIAGNOSIS — K59.09 OTHER CONSTIPATION: Primary | ICD-10-CM

## 2023-11-27 RX ORDER — PSYLLIUM HUSK 0.4 G
CAPSULE ORAL
Qty: 84 CAPSULE | Refills: 3 | Status: SHIPPED | OUTPATIENT
Start: 2023-11-27 | End: 2024-03-19

## 2023-12-20 DIAGNOSIS — G62.9 NEUROPATHY: Primary | ICD-10-CM

## 2023-12-20 RX ORDER — DEXTROMETHORPHAN HYDROBROMIDE, GUAIFENESIN 5; 100 MG/5ML; MG/5ML
LIQUID ORAL
Qty: 112 TABLET | Refills: 1 | Status: SHIPPED | OUTPATIENT
Start: 2023-12-20 | End: 2024-02-19

## 2024-01-04 PROBLEM — J18.9 PNEUMONIA, UNSPECIFIED ORGANISM: Status: RESOLVED | Noted: 2023-02-22 | Resolved: 2024-01-04

## 2024-01-04 PROBLEM — N32.81 OVERACTIVE BLADDER: Status: ACTIVE | Noted: 2023-02-22

## 2024-01-04 PROBLEM — F02.80 ALZHEIMER'S DISEASE, UNSPECIFIED (MULTI): Status: ACTIVE | Noted: 2021-08-06

## 2024-01-04 PROBLEM — G30.9 ALZHEIMER'S DISEASE, UNSPECIFIED (MULTI): Status: ACTIVE | Noted: 2021-08-06

## 2024-01-04 PROBLEM — J20.9 ACUTE BRONCHITIS, UNSPECIFIED: Status: RESOLVED | Noted: 2023-02-22 | Resolved: 2024-01-04

## 2024-01-04 PROBLEM — E03.9 HYPOTHYROIDISM: Status: ACTIVE | Noted: 2021-08-06

## 2024-01-10 ENCOUNTER — TELEPHONE (OUTPATIENT)
Dept: PRIMARY CARE | Facility: CLINIC | Age: 79
End: 2024-01-10
Payer: MEDICARE

## 2024-01-11 ENCOUNTER — OFFICE VISIT (OUTPATIENT)
Dept: PRIMARY CARE | Facility: CLINIC | Age: 79
End: 2024-01-11
Payer: MEDICARE

## 2024-01-11 ENCOUNTER — APPOINTMENT (OUTPATIENT)
Dept: LAB | Facility: LAB | Age: 79
End: 2024-01-11
Payer: MEDICARE

## 2024-01-11 VITALS
WEIGHT: 204 LBS | TEMPERATURE: 97.3 F | OXYGEN SATURATION: 98 % | SYSTOLIC BLOOD PRESSURE: 124 MMHG | BODY MASS INDEX: 37.54 KG/M2 | HEIGHT: 62 IN | HEART RATE: 67 BPM | RESPIRATION RATE: 18 BRPM | DIASTOLIC BLOOD PRESSURE: 74 MMHG

## 2024-01-11 DIAGNOSIS — F02.80 ALZHEIMER'S DISEASE, UNSPECIFIED (MULTI): ICD-10-CM

## 2024-01-11 DIAGNOSIS — I89.0 LYMPHEDEMA: ICD-10-CM

## 2024-01-11 DIAGNOSIS — I10 PRIMARY HYPERTENSION: Primary | ICD-10-CM

## 2024-01-11 DIAGNOSIS — E03.9 HYPOTHYROIDISM, UNSPECIFIED TYPE: ICD-10-CM

## 2024-01-11 DIAGNOSIS — I73.9 PERIPHERAL VASCULAR DISEASE (CMS-HCC): ICD-10-CM

## 2024-01-11 DIAGNOSIS — D50.9 IRON DEFICIENCY ANEMIA, UNSPECIFIED IRON DEFICIENCY ANEMIA TYPE: ICD-10-CM

## 2024-01-11 DIAGNOSIS — G30.9 ALZHEIMER'S DISEASE, UNSPECIFIED (MULTI): ICD-10-CM

## 2024-01-11 LAB
ANION GAP SERPL CALC-SCNC: 13 MMOL/L
BUN SERPL-MCNC: 37 MG/DL (ref 8–25)
CALCIUM SERPL-MCNC: 9.5 MG/DL (ref 8.5–10.4)
CHLORIDE SERPL-SCNC: 97 MMOL/L (ref 97–107)
CO2 SERPL-SCNC: 25 MMOL/L (ref 24–31)
CREAT SERPL-MCNC: 1.1 MG/DL (ref 0.4–1.6)
EGFRCR SERPLBLD CKD-EPI 2021: 51 ML/MIN/1.73M*2
GLUCOSE SERPL-MCNC: 89 MG/DL (ref 65–99)
POTASSIUM SERPL-SCNC: 4.6 MMOL/L (ref 3.4–5.1)
SODIUM SERPL-SCNC: 135 MMOL/L (ref 133–145)
TSH SERPL DL<=0.05 MIU/L-ACNC: 1.93 MIU/L (ref 0.27–4.2)

## 2024-01-11 PROCEDURE — 1036F TOBACCO NON-USER: CPT | Performed by: NURSE PRACTITIONER

## 2024-01-11 PROCEDURE — 99349 HOME/RES VST EST MOD MDM 40: CPT | Performed by: NURSE PRACTITIONER

## 2024-01-11 PROCEDURE — 1159F MED LIST DOCD IN RCRD: CPT | Performed by: NURSE PRACTITIONER

## 2024-01-11 PROCEDURE — 3078F DIAST BP <80 MM HG: CPT | Performed by: NURSE PRACTITIONER

## 2024-01-11 PROCEDURE — 3074F SYST BP LT 130 MM HG: CPT | Performed by: NURSE PRACTITIONER

## 2024-01-11 PROCEDURE — 80048 BASIC METABOLIC PNL TOTAL CA: CPT

## 2024-01-11 PROCEDURE — 84443 ASSAY THYROID STIM HORMONE: CPT

## 2024-01-11 PROCEDURE — 1126F AMNT PAIN NOTED NONE PRSNT: CPT | Performed by: NURSE PRACTITIONER

## 2024-01-11 PROCEDURE — 36415 COLL VENOUS BLD VENIPUNCTURE: CPT

## 2024-01-11 ASSESSMENT — PAIN SCALES - GENERAL: PAINLEVEL: 0-NO PAIN

## 2024-01-11 NOTE — PROGRESS NOTES
Subjective   Patient ID: Aleta Feliz is a 78 y.o. female who presents for Follow-up (Routine follow up, labs).    Visit for 77 y/o female seen today in private home, accompanied by son Reynaldo and caregiver for routine follow up. Patient is resting in recliner. She is alert, able to answer simple questions but is an overall poor historian 2/2 dementia. Her son and caregiver supplement HPI. Patient lives with her son Reynaldo and has private duty caregivers daily from 10am-4pm and then from 6pm-9pm every evening. Her caregivers assist with all ADL and meal preparation. Patient remains ambulatory with walker and stand by assistance. She denies recent fall or injury. She receives all medications from [facility] in pill packs. Patient denies appetite changes or signs of weight loss. Denies abdominal pain, nausea, vomiting. Denies any bowel or bladder concerns. She is incontinent of urine. She denies difficulty sleeping. Her son does report that she does not like to go to bed at night and will get angry if her caregivers try to put her to bed too early. There have been no behavioral outbursts or sundowning concerns. She continues to follow with cardiologist Dr. Edwards and was seen in November. She did not end up having the 2D echo because she was unable to tolerate having the tech press on her stomach/chest. She denies headaches, dizziness, chest pain, palpitations. Edema to legs minimal. She has not used her lymphedema pumps. She denies any acute concerns today. Due for routine lab work.       Home Visit:          Medically necessary due to: Illness or condition that results in activity lmitation or restriction that impacts the ability to leave home such as:, unsteady gait/poor balance, dementia/cognitive impairment         Current Outpatient Medications:     acetaminophen (Tylenol 8 HOUR) 650 mg ER tablet, TAKE 2 TABLETS BY MOUTH EVERY TWELVE HOURS AS NEEDED, Disp: 112 tablet, Rfl: 1    ascorbic acid (Vitamin C)  500 mg tablet, Take 1 tablet (500 mg) by mouth once daily., Disp: , Rfl:     cholecalciferol (Vitamin D-3) 50 mcg (2,000 unit) capsule, Take 1 capsule (2,000 Units) by mouth early in the morning.., Disp: , Rfl:     cranberry fruit concentrate (AZO CRANBERRY ORAL), Take 1 tablet by mouth once daily in the evening., Disp: , Rfl:     donepezil (Aricept) 10 mg tablet, Take 1 tablet (10 mg) by mouth once daily at bedtime., Disp: , Rfl:     ferrous sulfate 325 (65 Fe) MG tablet, Take 1 tablet (65 mg of iron) by mouth. Three times a week, Disp: , Rfl:     folic acid (Folvite) 800 mcg tablet, Take 1 tablet (0.8 mg) by mouth once daily., Disp: , Rfl:     furosemide (Lasix) 40 mg tablet, Take 1 tablet (40 mg) by mouth 2 times a day., Disp: , Rfl:     levothyroxine (Synthroid, Levoxyl) 50 mcg tablet, Take 1 tablet (50 mcg) by mouth once daily in the morning. Take before meals., Disp: , Rfl:     losartan (Cozaar) 50 mg tablet, Take 1 tablet (50 mg) by mouth once daily., Disp: , Rfl:     memantine (Namenda) 10 mg tablet, Take 1 tablet (10 mg) by mouth 2 times a day., Disp: , Rfl:     omeprazole (PriLOSEC) 20 mg DR capsule, Take 1 capsule (20 mg) by mouth once daily in the morning. Take before meals., Disp: , Rfl:     oxybutynin XL (Ditropan-XL) 10 mg 24 hr tablet, Take 1 tablet (10 mg) by mouth once every 24 hours., Disp: , Rfl:     potassium chloride CR 20 mEq ER tablet, Take 1 tablet (20 mEq) by mouth once daily. With food, Disp: , Rfl:     psyllium (MetamuciL) 0.4 gram capsule, TAKE 3 CAPSULES WITH 8 OZ OF LIQUID DAILY, Disp: 84 capsule, Rfl: 3    QUEtiapine (SEROquel) 25 mg tablet, Take 0.5 tablets (12.5 mg) by mouth once daily at bedtime., Disp: , Rfl:     rosuvastatin (Crestor) 10 mg tablet, Take 1 tablet (10 mg) by mouth once daily., Disp: , Rfl:     vitamin B complex (Vitamins B Complex) tablet, Take 1 tablet by mouth once daily., Disp: , Rfl:      Review of Systems  Constitutional:  Negative for appetite change, chills,  "fever and unexpected weight change.   HENT: Negative for hearing loss and trouble swallowing.    Respiratory: Negative for shortness of breath and wheezing.    Cardiovascular:  Positive for edema. Negative for chest pain and palpitations.   Gastrointestinal:  Negative for abdominal pain, constipation, diarrhea, nausea and vomiting.   Endocrine: Positive for thyroid disease   Genitourinary: Positive for urinary incontinence   Musculoskeletal:  Positive for unsteady gait   Neurological: Positive for memory loss  Psychiatric/Behavioral:  The patient is not nervous/anxious.      Objective   /74 (BP Location: Left arm, Patient Position: Sitting, BP Cuff Size: Adult)   Pulse 67   Temp 36.3 °C (97.3 °F) (Temporal)   Resp 18   Ht 1.575 m (5' 2\")   Wt 92.5 kg (204 lb)   SpO2 98%   BMI 37.31 kg/m²     Physical Exam  Constitutional:       General: She is not in acute distress.     Appearance: She is overweight      Comments: Sitting in recliner, legs elevated   HENT:      Head: Normocephalic and atraumatic.      Nose: Nose normal.      Mouth/Throat:      Mouth: Mucous membranes are moist.      Pharynx: Oropharynx is clear.   Eyes:      Pupils: Pupils are equal, round, and reactive to light.      Comments: Wearing glasses   Cardiovascular:      Rate and Rhythm: Normal rate and regular rhythm.      Pulses: Normal pulses.      Heart sounds: Normal heart sounds. No murmur heard.     No friction rub. No gallop.      Comments: trace edema bilaterally, non pitting   Pulmonary:      Effort: Pulmonary effort is normal. No respiratory distress.      Breath sounds: Normal breath sounds. No wheezing, rhonchi or rales.   Abdominal:      General: Bowel sounds are normal. There is no distension.      Palpations: Abdomen is soft.      Tenderness: There is no abdominal tenderness.   Musculoskeletal:      Cervical back: Neck supple.      Comments: Ambulatory with walker    Skin:     General: Skin is warm and dry.      Capillary " Refill: Capillary refill takes less than 2 seconds.      Comments: stasis dermatitis bilaterally  Neurological:      General: No focal deficit present.      Mental Status: She is alert. Mental status is at baseline.      Gait: Abnormal       Comments: Alert to self, place. Disoriented to time.   Psychiatric:         Mood and Affect: Mood normal.         Behavior: Behavior normal.     Assessment/Plan   Diagnoses and all orders for this visit:  Primary hypertension  Comments:  chronic, vitals stable, continue Losartan  Orders:  -     Basic metabolic panel  Peripheral vascular disease (CMS/[facility])  Comments:  chronic, stable  Lymphedema  Comments:  chronic, edema to LE stable  Hypothyroidism, unspecified type  Comments:  chronic, continue levothyroxine  Orders:  -     Tsh With Reflex To Free T4 If Abnormal  Iron deficiency anemia, unspecified iron deficiency anemia type  Comments:  chronic, managed with ferrous sulfate  Alzheimer's disease, unspecified (CMS/[facility])  Comments:  chronic, progressive, memory impairment noted. Continue Donepezil, Namenda, Seroquel    Labs obtained in home- CBC, BMP, TSH, A1c. Did have some difficulty with lavender tube. Uncertain if lab will be able to run test. Overall, pt tolerated lab draw well.        Theresa Sanders, APRN-CNP

## 2024-02-19 DIAGNOSIS — G62.9 NEUROPATHY: ICD-10-CM

## 2024-02-19 DIAGNOSIS — K21.9 GASTROESOPHAGEAL REFLUX DISEASE WITHOUT ESOPHAGITIS: ICD-10-CM

## 2024-02-19 DIAGNOSIS — D50.9 IRON DEFICIENCY ANEMIA, UNSPECIFIED IRON DEFICIENCY ANEMIA TYPE: ICD-10-CM

## 2024-02-19 DIAGNOSIS — E78.5 HYPERLIPIDEMIA, UNSPECIFIED HYPERLIPIDEMIA TYPE: ICD-10-CM

## 2024-02-19 RX ORDER — ROSUVASTATIN CALCIUM 10 MG/1
10 TABLET, COATED ORAL DAILY
Qty: 30 TABLET | Refills: 11 | Status: SHIPPED | OUTPATIENT
Start: 2024-02-19

## 2024-02-19 RX ORDER — FERROUS SULFATE TAB 325 MG (65 MG ELEMENTAL FE) 325 (65 FE) MG
1 TAB ORAL 3 TIMES WEEKLY
Qty: 12 TABLET | Refills: 11 | Status: SHIPPED | OUTPATIENT
Start: 2024-02-19

## 2024-02-19 RX ORDER — DEXTROMETHORPHAN HYDROBROMIDE, GUAIFENESIN 5; 100 MG/5ML; MG/5ML
LIQUID ORAL
Qty: 120 TABLET | Refills: 11 | Status: SHIPPED | OUTPATIENT
Start: 2024-02-19

## 2024-02-19 RX ORDER — OMEPRAZOLE 20 MG/1
CAPSULE, DELAYED RELEASE ORAL
Qty: 30 CAPSULE | Refills: 11 | Status: SHIPPED | OUTPATIENT
Start: 2024-02-19

## 2024-03-08 ENCOUNTER — TELEPHONE (OUTPATIENT)
Dept: PRIMARY CARE | Facility: CLINIC | Age: 79
End: 2024-03-08
Payer: MEDICARE

## 2024-03-11 ENCOUNTER — OFFICE VISIT (OUTPATIENT)
Dept: PRIMARY CARE | Facility: CLINIC | Age: 79
End: 2024-03-11
Payer: MEDICARE

## 2024-03-11 ENCOUNTER — LAB (OUTPATIENT)
Dept: LAB | Facility: LAB | Age: 79
End: 2024-03-11
Payer: MEDICARE

## 2024-03-11 VITALS
WEIGHT: 190 LBS | TEMPERATURE: 97.3 F | OXYGEN SATURATION: 97 % | HEART RATE: 60 BPM | RESPIRATION RATE: 16 BRPM | SYSTOLIC BLOOD PRESSURE: 114 MMHG | BODY MASS INDEX: 34.96 KG/M2 | DIASTOLIC BLOOD PRESSURE: 60 MMHG | HEIGHT: 62 IN

## 2024-03-11 DIAGNOSIS — I73.9 PERIPHERAL VASCULAR DISEASE (CMS-HCC): Primary | ICD-10-CM

## 2024-03-11 DIAGNOSIS — I10 PRIMARY HYPERTENSION: ICD-10-CM

## 2024-03-11 DIAGNOSIS — E03.9 HYPOTHYROIDISM, UNSPECIFIED TYPE: ICD-10-CM

## 2024-03-11 DIAGNOSIS — Z00.00 HEALTHCARE MAINTENANCE: ICD-10-CM

## 2024-03-11 DIAGNOSIS — G30.9 ALZHEIMER'S DISEASE, UNSPECIFIED (MULTI): ICD-10-CM

## 2024-03-11 DIAGNOSIS — D50.9 IRON DEFICIENCY ANEMIA, UNSPECIFIED IRON DEFICIENCY ANEMIA TYPE: ICD-10-CM

## 2024-03-11 DIAGNOSIS — I89.0 LYMPHEDEMA: ICD-10-CM

## 2024-03-11 DIAGNOSIS — F02.80 ALZHEIMER'S DISEASE, UNSPECIFIED (MULTI): ICD-10-CM

## 2024-03-11 LAB
ERYTHROCYTE [DISTWIDTH] IN BLOOD BY AUTOMATED COUNT: 12.6 % (ref 11.5–14.5)
HCT VFR BLD AUTO: 38.5 % (ref 36–46)
HGB BLD-MCNC: 12.2 G/DL (ref 12–16)
MCH RBC QN AUTO: 31.5 PG (ref 26–34)
MCHC RBC AUTO-ENTMCNC: 31.7 G/DL (ref 32–36)
MCV RBC AUTO: 100 FL (ref 80–100)
NRBC BLD-RTO: 0 /100 WBCS (ref 0–0)
PLATELET # BLD AUTO: 300 X10*3/UL (ref 150–450)
RBC # BLD AUTO: 3.87 X10*6/UL (ref 4–5.2)
WBC # BLD AUTO: 8.3 X10*3/UL (ref 4.4–11.3)

## 2024-03-11 PROCEDURE — 36415 COLL VENOUS BLD VENIPUNCTURE: CPT

## 2024-03-11 PROCEDURE — 1159F MED LIST DOCD IN RCRD: CPT | Performed by: NURSE PRACTITIONER

## 2024-03-11 PROCEDURE — 99349 HOME/RES VST EST MOD MDM 40: CPT | Performed by: NURSE PRACTITIONER

## 2024-03-11 PROCEDURE — 1126F AMNT PAIN NOTED NONE PRSNT: CPT | Performed by: NURSE PRACTITIONER

## 2024-03-11 PROCEDURE — 3078F DIAST BP <80 MM HG: CPT | Performed by: NURSE PRACTITIONER

## 2024-03-11 PROCEDURE — 1036F TOBACCO NON-USER: CPT | Performed by: NURSE PRACTITIONER

## 2024-03-11 PROCEDURE — 85027 COMPLETE CBC AUTOMATED: CPT

## 2024-03-11 PROCEDURE — 83036 HEMOGLOBIN GLYCOSYLATED A1C: CPT

## 2024-03-11 PROCEDURE — 1160F RVW MEDS BY RX/DR IN RCRD: CPT | Performed by: NURSE PRACTITIONER

## 2024-03-11 PROCEDURE — 80048 BASIC METABOLIC PNL TOTAL CA: CPT

## 2024-03-11 PROCEDURE — 36415 COLL VENOUS BLD VENIPUNCTURE: CPT | Performed by: NURSE PRACTITIONER

## 2024-03-11 PROCEDURE — 3074F SYST BP LT 130 MM HG: CPT | Performed by: NURSE PRACTITIONER

## 2024-03-11 RX ORDER — AMMONIUM LACTATE 12 G/100G
1 LOTION TOPICAL AS NEEDED
Qty: 225 G | Refills: 1 | Status: SHIPPED | OUTPATIENT
Start: 2024-03-11

## 2024-03-11 RX ORDER — AMMONIUM LACTATE 12 G/100G
1 LOTION TOPICAL AS NEEDED
COMMUNITY
End: 2024-03-11 | Stop reason: SDUPTHER

## 2024-03-11 ASSESSMENT — PAIN SCALES - GENERAL: PAINLEVEL: 0-NO PAIN

## 2024-03-11 NOTE — PROGRESS NOTES
Subjective   Patient ID: Aleta Feliz is a 78 y.o. female who presents for Follow-up (Routine follow up, labs ).    Visit for 77 y/o female seen today in private home, accompanied by son Reynaldo and caregiver for routine follow up of chronic medical conditions. Patient is due to have labs today. She was seen in home on 1/11/24. Labs were obtained but the CBC/A1c clotted. Patient is sitting in recliner today watching TV. She is alert to self and place. She is able to answer simple questions but is an overall poor historian 2/2 dementia. Her son and caregiver provide most of the health history. Patient has caregivers in the home daily that assist with ADLs and meal preparation. Patient has had weight loss. Her caregiver reports that she is eating healthier foods and smaller portions. Pt denies any appetite changes, abdominal pain, nausea, vomiting. Denies any bowel or bladder concerns. She is incontinent of urine. Denies difficulty sleeping. Patient receives all medications through [facility] in pill packs. Her caregiver is requesting a refill of Ammonium Lactate lotion to go to local [facility]. Patient remains ambulatory with walker and stand by assistance. She denies recent fall or injury. She continues to follow with cardiologist Dr. Edwards. She denies headaches, dizziness, chest pain, palpitations. Edema to legs minimal. She has not used her lymphedema pumps. She denies any acute concerns today.      Home Visit:          Medically necessary due to: Illness or condition that results in activity lmitation or restriction that impacts the ability to leave home such as:, unsteady gait/poor balance, dementia/cognitive impairment         Current Outpatient Medications:     acetaminophen (Tylenol 8 HOUR) 650 mg ER tablet, TAKE 2 TABLETS BY MOUTH EVERY TWELVE HOURS AS NEEDED, Disp: 120 tablet, Rfl: 11    ammonium lactate (Lac-Hydrin) 12 % lotion, Apply 1 Application topically if needed for dry skin., Disp: 225 g,  Rfl: 1    ascorbic acid (Vitamin C) 500 mg tablet, Take 1 tablet (500 mg) by mouth once daily., Disp: , Rfl:     cholecalciferol (Vitamin D-3) 50 mcg (2,000 unit) capsule, Take 1 capsule (2,000 Units) by mouth early in the morning.., Disp: , Rfl:     cranberry fruit concentrate (AZO CRANBERRY ORAL), Take 1 tablet by mouth once daily in the evening., Disp: , Rfl:     donepezil (Aricept) 10 mg tablet, Take 1 tablet (10 mg) by mouth once daily at bedtime., Disp: , Rfl:     FeroSuL tablet, TAKE 1 TABLET BY MOUTH THREE TIMES WEEKLY, Disp: 12 tablet, Rfl: 11    folic acid (Folvite) 800 mcg tablet, Take 1 tablet (0.8 mg) by mouth once daily., Disp: , Rfl:     furosemide (Lasix) 40 mg tablet, Take 1 tablet (40 mg) by mouth 2 times a day., Disp: , Rfl:     levothyroxine (Synthroid, Levoxyl) 50 mcg tablet, Take 1 tablet (50 mcg) by mouth once daily in the morning. Take before meals., Disp: , Rfl:     losartan (Cozaar) 50 mg tablet, Take 1 tablet (50 mg) by mouth once daily., Disp: , Rfl:     memantine (Namenda) 10 mg tablet, Take 1 tablet (10 mg) by mouth 2 times a day., Disp: , Rfl:     omeprazole (PriLOSEC) 20 mg DR capsule, TAKE 1 CAPSULE BY MOUTH 30 MINUTE(S) BEFORE MORNING MEAL, Disp: 30 capsule, Rfl: 11    oxybutynin XL (Ditropan-XL) 10 mg 24 hr tablet, Take 1 tablet (10 mg) by mouth once every 24 hours., Disp: , Rfl:     potassium chloride CR 20 mEq ER tablet, Take 1 tablet (20 mEq) by mouth once daily. With food, Disp: , Rfl:     psyllium (MetamuciL) 0.4 gram capsule, TAKE 3 CAPSULES WITH 8 OZ OF LIQUID DAILY, Disp: 84 capsule, Rfl: 3    QUEtiapine (SEROquel) 25 mg tablet, Take 0.5 tablets (12.5 mg) by mouth once daily at bedtime., Disp: , Rfl:     rosuvastatin (Crestor) 10 mg tablet, TAKE 1 TABLET BY MOUTH DAILY, Disp: 30 tablet, Rfl: 11    vitamin B complex (Vitamins B Complex) tablet, Take 1 tablet by mouth once daily., Disp: , Rfl:      Review of Systems  Constitutional: Positive for weight loss. Negative for  "appetite change, chills, fever.  HENT: Negative for hearing loss and trouble swallowing.    Respiratory: Negative for shortness of breath and wheezing.    Cardiovascular: Positive for edema. Negative for chest pain and palpitations.   Gastrointestinal:  Negative for abdominal pain, constipation, diarrhea, nausea and vomiting.   Endocrine: Positive for thyroid disease   Genitourinary: Positive for urinary incontinence   Musculoskeletal:  Positive for unsteady gait   Neurological: Positive for memory loss  Psychiatric/Behavioral:  The patient is not nervous/anxious.      Objective   /60 (BP Location: Left arm, Patient Position: Sitting, BP Cuff Size: Adult)   Pulse 60   Temp 36.3 °C (97.3 °F) (Temporal)   Resp 16   Ht 1.575 m (5' 2\")   Wt 86.2 kg (190 lb)   SpO2 97%   BMI 34.75 kg/m²     Physical Exam  Constitutional:       General: She is not in acute distress.     Appearance: She is overweight      Comments: Alert, Sitting in recliner, legs elevated   HENT:      Head: Normocephalic and atraumatic.      Nose: Nose normal.      Mouth/Throat:      Mouth: Mucous membranes are moist.      Pharynx: Oropharynx is clear.   Eyes:      Pupils: Pupils are equal, round, and reactive to light.      Comments: Wearing glasses   Cardiovascular:      Rate and Rhythm: Normal rate and regular rhythm.      Pulses: Normal pulses.      Heart sounds: Normal heart sounds. No murmur heard.     No friction rub. No gallop.      Comments: trace edema bilaterally, non pitting   Pulmonary:      Effort: Pulmonary effort is normal. No respiratory distress.      Breath sounds: Normal breath sounds. No wheezing, rhonchi or rales.   Abdominal:      General: Bowel sounds are normal. There is no distension.      Palpations: Abdomen is soft.      Tenderness: There is no abdominal tenderness.   Musculoskeletal:      Cervical back: Neck supple.      Comments: Ambulatory with walker    Skin:     General: Skin is warm and dry.      Capillary " Refill: Capillary refill takes less than 2 seconds.      Comments: stasis dermatitis bilaterally  Neurological:      General: No focal deficit present.      Mental Status: She is alert. Mental status is at baseline.      Gait: Abnormal       Comments: Alert to self, place. Disoriented to time.   Psychiatric:         Mood and Affect: Mood normal.         Behavior: Behavior normal.     Lab Results   Component Value Date    WBC 8.3 03/11/2024    HGB 12.2 03/11/2024    HCT 38.5 03/11/2024     03/11/2024     03/11/2024        Chemistry    Lab Results   Component Value Date/Time     03/11/2024 1500    K 4.2 03/11/2024 1500    CL 99 03/11/2024 1500    CO2 26 03/11/2024 1500    BUN 39 (H) 03/11/2024 1500    CREATININE 1.10 (H) 03/11/2024 1500    Lab Results   Component Value Date/Time    CALCIUM 9.7 03/11/2024 1500    ALKPHOS 45 04/21/2023 1348    AST 16 04/21/2023 1348    ALT 8 04/21/2023 1348    BILITOT 0.4 04/21/2023 1348           Assessment/Plan   Diagnoses and all orders for this visit:  Peripheral vascular disease (CMS/HCC)  -     ammonium lactate (Lac-Hydrin) 12 % lotion; Apply 1 Application topically if needed for dry skin.  Lymphedema  -chronic, stable  -pt to use lymphedema pumps as needed  Iron deficiency anemia, unspecified iron deficiency anemia type  -     CBC; Future  -chronic, managed with ferrous sulfate   -will check CBC today   Hypothyroidism, unspecified type  -chronic, continue levothyroxine  Primary hypertension  -     Basic metabolic panel; Future  -chronic, vitals stable  -continue losartan  Alzheimer's disease, unspecified (CMS/HCC)  -chronic, progressive  -memory impairment noted  -continue donepezil, memantine  -continue [employer] [facility] maintenance  -     Hemoglobin A1c; Future    Routine labs obtained in home- CBC, BMP, A1c level- pt tolerated well      HANS Chadwick-CNP

## 2024-03-12 LAB
ANION GAP SERPL CALC-SCNC: 21 MMOL/L (ref 10–20)
BUN SERPL-MCNC: 39 MG/DL (ref 6–23)
CALCIUM SERPL-MCNC: 9.7 MG/DL (ref 8.6–10.3)
CHLORIDE SERPL-SCNC: 99 MMOL/L (ref 98–107)
CO2 SERPL-SCNC: 26 MMOL/L (ref 21–32)
CREAT SERPL-MCNC: 1.1 MG/DL (ref 0.5–1.05)
EGFRCR SERPLBLD CKD-EPI 2021: 51 ML/MIN/1.73M*2
EST. AVERAGE GLUCOSE BLD GHB EST-MCNC: 108 MG/DL
GLUCOSE SERPL-MCNC: 90 MG/DL (ref 74–99)
HBA1C MFR BLD: 5.4 %
POTASSIUM SERPL-SCNC: 4.2 MMOL/L (ref 3.5–5.3)
SODIUM SERPL-SCNC: 142 MMOL/L (ref 136–145)

## 2024-03-19 DIAGNOSIS — K59.09 OTHER CONSTIPATION: ICD-10-CM

## 2024-03-19 RX ORDER — PSYLLIUM HUSK 0.4 G
CAPSULE ORAL
Qty: 84 CAPSULE | Refills: 11 | Status: SHIPPED | OUTPATIENT
Start: 2024-03-19

## 2024-04-09 ENCOUNTER — TELEPHONE (OUTPATIENT)
Dept: PRIMARY CARE | Facility: CLINIC | Age: 79
End: 2024-04-09
Payer: MEDICARE

## 2024-04-09 DIAGNOSIS — R09.89 CHEST CONGESTION: Primary | ICD-10-CM

## 2024-04-09 NOTE — TELEPHONE ENCOUNTER
No known COVID exposure, patient now experiencing loose stools x2; Linda states patient COVID home test results positive.  Patient does have oxygen in the home for use at night of sleep.  Please Advise

## 2024-04-09 NOTE — TELEPHONE ENCOUNTER
Linda states patient has congestion and cough, states patient is coughing mucous, thick yellow for 3 days, mucinex not helping. Patient caregiver states patient has no temperature, no chills, no nausea or vomitting, no sore throat.  Poor PO appetite Linda notes. Please Advise.

## 2024-04-10 NOTE — TELEPHONE ENCOUNTER
Per conversation with patient son, Reynaldo, patient cough seems to have settled down this morning, patient is taking increased fluids, Reynaldo is aware to call office for support with any additional symptom management advice or transport to hospital if patient is in distress.

## 2024-05-10 ENCOUNTER — TELEPHONE (OUTPATIENT)
Dept: PRIMARY CARE | Facility: CLINIC | Age: 79
End: 2024-05-10
Payer: MEDICARE

## 2024-05-10 NOTE — TELEPHONE ENCOUNTER
5/13/24 House Calls visit with Theresa Sanders NP confirmed via phone with Reynaldo/ patient's son.

## 2024-05-13 ENCOUNTER — OFFICE VISIT (OUTPATIENT)
Dept: PRIMARY CARE | Facility: CLINIC | Age: 79
End: 2024-05-13
Payer: MEDICARE

## 2024-05-13 VITALS
OXYGEN SATURATION: 97 % | DIASTOLIC BLOOD PRESSURE: 70 MMHG | WEIGHT: 195 LBS | SYSTOLIC BLOOD PRESSURE: 120 MMHG | RESPIRATION RATE: 16 BRPM | HEIGHT: 62 IN | HEART RATE: 77 BPM | TEMPERATURE: 97.3 F | BODY MASS INDEX: 35.88 KG/M2

## 2024-05-13 DIAGNOSIS — D50.9 IRON DEFICIENCY ANEMIA, UNSPECIFIED IRON DEFICIENCY ANEMIA TYPE: ICD-10-CM

## 2024-05-13 DIAGNOSIS — I10 PRIMARY HYPERTENSION: Primary | ICD-10-CM

## 2024-05-13 DIAGNOSIS — U07.1 COVID-19: ICD-10-CM

## 2024-05-13 DIAGNOSIS — I89.0 LYMPHEDEMA: ICD-10-CM

## 2024-05-13 DIAGNOSIS — K21.9 GASTROESOPHAGEAL REFLUX DISEASE WITHOUT ESOPHAGITIS: ICD-10-CM

## 2024-05-13 DIAGNOSIS — F01.50 VASCULAR DEMENTIA WITHOUT BEHAVIORAL DISTURBANCE (MULTI): ICD-10-CM

## 2024-05-13 DIAGNOSIS — R32 URINARY INCONTINENCE, UNSPECIFIED TYPE: ICD-10-CM

## 2024-05-13 PROBLEM — R11.10 VOMITING: Status: RESOLVED | Noted: 2023-02-06 | Resolved: 2024-05-13

## 2024-05-13 PROCEDURE — 1160F RVW MEDS BY RX/DR IN RCRD: CPT | Performed by: NURSE PRACTITIONER

## 2024-05-13 PROCEDURE — 1036F TOBACCO NON-USER: CPT | Performed by: NURSE PRACTITIONER

## 2024-05-13 PROCEDURE — 3078F DIAST BP <80 MM HG: CPT | Performed by: NURSE PRACTITIONER

## 2024-05-13 PROCEDURE — 3074F SYST BP LT 130 MM HG: CPT | Performed by: NURSE PRACTITIONER

## 2024-05-13 PROCEDURE — 1126F AMNT PAIN NOTED NONE PRSNT: CPT | Performed by: NURSE PRACTITIONER

## 2024-05-13 PROCEDURE — 1159F MED LIST DOCD IN RCRD: CPT | Performed by: NURSE PRACTITIONER

## 2024-05-13 PROCEDURE — 99349 HOME/RES VST EST MOD MDM 40: CPT | Performed by: NURSE PRACTITIONER

## 2024-05-13 ASSESSMENT — PAIN SCALES - GENERAL: PAINLEVEL: 0-NO PAIN

## 2024-05-13 NOTE — PROGRESS NOTES
Subjective   Patient ID: Aleta Feliz is a 78 y.o. female who presents for Follow-up (Routine follow up, recent COVID illness).    Visit for 77 y/o female seen today in private home, accompanied by son Reynaldo and caregiver Linda for routine follow up. PMHx of HTN, Hyperlipidemia, Hypothyroidism, PVD, Lymphedema, Overactive bladder, anemia, osteoporosis, constipation, GERD, Vascular dementia, insomnia, depression. Patient was initially lying in bed upon provider arrival. Her caregiver got her up, showered and she is sitting in wheelchair at dining room table this morning. She is eating breakfast now. Alert to self and place. She is able to answer simple questions but is an overall poor historian secondary to dementia. Her son and caregiver provide most of the health history. Patient has caregivers in the home 7 days per week from 10am-4pm and then from 6pm-9pm. She requires assistance with all ADLs and with meal preparation.  Pt denies any appetite changes, abdominal pain, nausea, vomiting. Denies any bowel or bladder concerns. She is incontinent of urine. Denies difficulty sleeping. Patient receives all medications through [facility] in pill packs. Patient remains ambulatory with walker and stand by assistance. She denies recent fall or injury. She has HTN, PVD, Lymphedema. She continues to follow with cardiologist Dr. Edwards. She denies headaches, dizziness, chest pain, palpitations. Edema to legs minimal. She has not used her lymphedema pumps. She had recent covid illness. Reported cough and congestion. Tested positive for covid on 4/10. She was unable to take Paxlovid due to her antipsychotic medication. She did recover well without issue. She will have an occasional cough but denies any other respiratory concerns.     Home Visit:          Medically necessary due to: Illness or condition that results in activity lmitation or restriction that impacts the ability to leave home such as:, unsteady gait/poor  balance, dementia/cognitive impairment         Current Outpatient Medications:     furosemide (Lasix) 40 mg tablet, Take 1 tablet (40 mg) by mouth 2 times a day., Disp: , Rfl:     acetaminophen (Tylenol 8 HOUR) 650 mg ER tablet, TAKE 2 TABLETS BY MOUTH EVERY TWELVE HOURS AS NEEDED, Disp: 120 tablet, Rfl: 11    ammonium lactate (Lac-Hydrin) 12 % lotion, Apply 1 Application topically if needed for dry skin., Disp: 225 g, Rfl: 1    ascorbic acid (Vitamin C) 500 mg tablet, Take 1 tablet (500 mg) by mouth once daily., Disp: , Rfl:     cholecalciferol (Vitamin D-3) 50 mcg (2,000 unit) capsule, Take 1 capsule (2,000 Units) by mouth early in the morning.., Disp: , Rfl:     cranberry fruit concentrate (AZO CRANBERRY ORAL), Take 1 tablet by mouth once daily in the evening., Disp: , Rfl:     donepezil (Aricept) 10 mg tablet, Take 1 tablet (10 mg) by mouth once daily at bedtime., Disp: , Rfl:     FeroSuL tablet, TAKE 1 TABLET BY MOUTH THREE TIMES WEEKLY, Disp: 12 tablet, Rfl: 11    folic acid (Folvite) 800 mcg tablet, Take 1 tablet (0.8 mg) by mouth once daily., Disp: , Rfl:     levothyroxine (Synthroid, Levoxyl) 50 mcg tablet, Take 1 tablet (50 mcg) by mouth once daily in the morning. Take before meals., Disp: , Rfl:     losartan (Cozaar) 50 mg tablet, Take 1 tablet (50 mg) by mouth once daily., Disp: , Rfl:     memantine (Namenda) 10 mg tablet, Take 1 tablet (10 mg) by mouth 2 times a day., Disp: , Rfl:     MetamuciL 0.4 gram capsule, TAKE 3 CAPSULES BY MOUTH WITH 8 OZ OF LIQUID DAILY, Disp: 84 capsule, Rfl: 11    omeprazole (PriLOSEC) 20 mg DR capsule, TAKE 1 CAPSULE BY MOUTH 30 MINUTE(S) BEFORE MORNING MEAL, Disp: 30 capsule, Rfl: 11    oxybutynin XL (Ditropan-XL) 10 mg 24 hr tablet, Take 1 tablet (10 mg) by mouth once every 24 hours., Disp: , Rfl:     potassium chloride CR 20 mEq ER tablet, Take 1 tablet (20 mEq) by mouth once daily. With food, Disp: , Rfl:     QUEtiapine (SEROquel) 25 mg tablet, Take 0.5 tablets (12.5 mg)  "by mouth once daily at bedtime., Disp: , Rfl:     rosuvastatin (Crestor) 10 mg tablet, TAKE 1 TABLET BY MOUTH DAILY, Disp: 30 tablet, Rfl: 11    vitamin B complex (Vitamins B Complex) tablet, Take 1 tablet by mouth once daily., Disp: , Rfl:      Review of Systems  Constitutional: Negative for appetite change, chills, fever.  HENT: Negative for hearing loss and trouble swallowing.    Respiratory: Negative for shortness of breath and wheezing.    Cardiovascular: Positive for edema. Negative for chest pain and palpitations.   Gastrointestinal:  Negative for abdominal pain, constipation, diarrhea, nausea and vomiting.   Endocrine: Positive for thyroid disease   Genitourinary: Positive for urinary incontinence   Musculoskeletal:  Positive for unsteady gait   Neurological: Positive for memory loss  Psychiatric/Behavioral:  The patient is not nervous/anxious.      Objective   /70 (BP Location: Right arm, Patient Position: Sitting, BP Cuff Size: Adult)   Pulse 77   Temp 36.3 °C (97.3 °F) (Temporal)   Resp 16   Ht 1.575 m (5' 2\")   Wt 88.5 kg (195 lb)   SpO2 97%   BMI 35.67 kg/m²     Physical Exam  Constitutional:       General: She is not in acute distress.     Appearance: She is overweight      Comments: Alert, Sitting in wheelchair   HENT:      Head: Normocephalic and atraumatic.      Nose: Nose normal.      Mouth/Throat:      Mouth: Mucous membranes are moist.      Pharynx: Oropharynx is clear.   Eyes:      Pupils: Pupils are equal, round, and reactive to light.  Cardiovascular:      Rate and Rhythm: Normal rate and regular rhythm.      Pulses: Normal pulses.      Heart sounds: Normal heart sounds. No murmur heard.     No friction rub. No gallop.      Comments: trace edema bilaterally, non pitting   Pulmonary:      Effort: Pulmonary effort is normal. No respiratory distress.      Breath sounds: Normal breath sounds. No wheezing, rhonchi or rales.   Abdominal:      General: Bowel sounds are normal. There is " no distension.      Palpations: Abdomen is soft.      Tenderness: There is no abdominal tenderness.   Musculoskeletal:      Cervical back: Neck supple.      Comments: Ambulatory with walker    Skin:     General: Skin is warm and dry.      Capillary Refill: Capillary refill takes less than 2 seconds.      Comments: stasis dermatitis bilaterally  Neurological:      General: No focal deficit present.      Mental Status: She is alert. Mental status is at baseline.      Gait: Abnormal       Comments: Alert to self, place. Disoriented to time.   Psychiatric:         Mood and Affect: Mood normal.         Behavior: Behavior normal.     Lab Results   Component Value Date    WBC 8.3 03/11/2024    HGB 12.2 03/11/2024    HCT 38.5 03/11/2024     03/11/2024     03/11/2024        Chemistry    Lab Results   Component Value Date/Time     03/11/2024 1500    K 4.2 03/11/2024 1500    CL 99 03/11/2024 1500    CO2 26 03/11/2024 1500    BUN 39 (H) 03/11/2024 1500    CREATININE 1.10 (H) 03/11/2024 1500    Lab Results   Component Value Date/Time    CALCIUM 9.7 03/11/2024 1500    ALKPHOS 45 04/21/2023 1348    AST 16 04/21/2023 1348    ALT 8 04/21/2023 1348    BILITOT 0.4 04/21/2023 1348         Lab Results   Component Value Date    HGBA1C 5.4 03/11/2024      Assessment/Plan   Diagnoses and all orders for this visit:  Primary hypertension  -chronic, vitals stable  -continue Losartan    Lymphedema  -chronic, LE edema stable  -pt to use lymphedema pumps as needed    Iron deficiency anemia, unspecified iron deficiency anemia type  -chronic, H/H stable  -continue Ferrous Sulfate, Folic acid     Gastroesophageal reflux disease without esophagitis  -chronic, stable  -no current GI concerns   -continue Omeprazole     Urinary incontinence, unspecified type  -chronic, intermittent  -has caregivers in home, requires assistance with toileting     Vascular dementia without behavioral disturbance (Multi)  -chronic, memory impairment  noted  -no recent behaviors reported  -continue donepezil, memantine, seroquel     COVID-19  -acute illness in April 2024  -recovered well without issue       Theresa Sanders, HANS-CNP

## 2024-06-10 DIAGNOSIS — I89.0 LYMPHEDEMA: Primary | ICD-10-CM

## 2024-06-10 RX ORDER — FUROSEMIDE 40 MG/1
40 TABLET ORAL 2 TIMES DAILY
Qty: 56 TABLET | Refills: 11 | Status: SHIPPED | OUTPATIENT
Start: 2024-06-10

## 2024-06-10 RX ORDER — POTASSIUM CHLORIDE 20 MEQ/1
20 TABLET, EXTENDED RELEASE ORAL DAILY
Qty: 28 TABLET | Refills: 11 | Status: SHIPPED | OUTPATIENT
Start: 2024-06-10

## 2024-07-03 DIAGNOSIS — E55.9 VITAMIN D DEFICIENCY: ICD-10-CM

## 2024-07-03 DIAGNOSIS — D50.9 IRON DEFICIENCY ANEMIA, UNSPECIFIED IRON DEFICIENCY ANEMIA TYPE: ICD-10-CM

## 2024-07-03 DIAGNOSIS — I10 PRIMARY HYPERTENSION: ICD-10-CM

## 2024-07-03 DIAGNOSIS — F02.80 ALZHEIMER'S DISEASE, UNSPECIFIED (MULTI): Primary | ICD-10-CM

## 2024-07-03 DIAGNOSIS — G30.9 ALZHEIMER'S DISEASE, UNSPECIFIED (MULTI): Primary | ICD-10-CM

## 2024-07-03 PROBLEM — I30.9 ACUTE PERICARDITIS, UNSPECIFIED (HHS-HCC): Status: RESOLVED | Noted: 2023-02-22 | Resolved: 2024-07-03
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
## 2024-07-05 ENCOUNTER — TELEPHONE (OUTPATIENT)
Dept: PRIMARY CARE | Facility: CLINIC | Age: 79
End: 2024-07-05
Payer: MEDICARE

## 2024-07-05 RX ORDER — B-COMPLEX WITH VITAMIN C
1 TABLET ORAL DAILY
Qty: 28 TABLET | Refills: 11 | Status: SHIPPED | OUTPATIENT
Start: 2024-07-05

## 2024-07-05 RX ORDER — QUETIAPINE FUMARATE 25 MG/1
TABLET, FILM COATED ORAL
Qty: 14 TABLET | Refills: 11 | Status: SHIPPED | OUTPATIENT
Start: 2024-07-05

## 2024-07-05 RX ORDER — LOSARTAN POTASSIUM 50 MG/1
50 TABLET ORAL DAILY
Qty: 28 TABLET | Refills: 11 | Status: SHIPPED | OUTPATIENT
Start: 2024-07-05

## 2024-07-05 RX ORDER — DONEPEZIL HYDROCHLORIDE 10 MG/1
10 TABLET, FILM COATED ORAL NIGHTLY
Qty: 28 TABLET | Refills: 11 | Status: SHIPPED | OUTPATIENT
Start: 2024-07-05

## 2024-07-05 RX ORDER — FOLIC ACID 0.8 MG
800 TABLET ORAL DAILY
Qty: 28 TABLET | Refills: 11 | Status: SHIPPED | OUTPATIENT
Start: 2024-07-05

## 2024-07-05 RX ORDER — ASCORBIC ACID 500 MG
500 TABLET ORAL DAILY
Qty: 28 TABLET | Refills: 11 | Status: SHIPPED | OUTPATIENT
Start: 2024-07-05

## 2024-07-05 RX ORDER — NICOTINE 11MG/24HR
PATCH, TRANSDERMAL 24 HOURS TRANSDERMAL DAILY
Qty: 28 CAPSULE | Refills: 11 | Status: SHIPPED | OUTPATIENT
Start: 2024-07-05

## 2024-07-05 RX ORDER — MEMANTINE HYDROCHLORIDE 10 MG/1
10 TABLET ORAL 2 TIMES DAILY
Qty: 56 TABLET | Refills: 11 | Status: SHIPPED | OUTPATIENT
Start: 2024-07-05

## 2024-07-05 NOTE — TELEPHONE ENCOUNTER
7/8 1:30p GS . Call placed to patient number as listed, [phone], this nurse speaking with Patient sonReynaldo confirming visit and gives verbal consent for provider visit. COVID Screening negative.

## 2024-07-08 ENCOUNTER — OFFICE VISIT (OUTPATIENT)
Dept: PRIMARY CARE | Facility: CLINIC | Age: 79
End: 2024-07-08
Payer: MEDICARE

## 2024-07-08 VITALS
BODY MASS INDEX: 35.88 KG/M2 | RESPIRATION RATE: 18 BRPM | HEART RATE: 66 BPM | DIASTOLIC BLOOD PRESSURE: 64 MMHG | OXYGEN SATURATION: 100 % | WEIGHT: 195 LBS | TEMPERATURE: 97.3 F | HEIGHT: 62 IN | SYSTOLIC BLOOD PRESSURE: 110 MMHG

## 2024-07-08 DIAGNOSIS — I10 PRIMARY HYPERTENSION: Primary | ICD-10-CM

## 2024-07-08 DIAGNOSIS — F01.50 VASCULAR DEMENTIA WITHOUT BEHAVIORAL DISTURBANCE (MULTI): ICD-10-CM

## 2024-07-08 DIAGNOSIS — K21.9 GASTROESOPHAGEAL REFLUX DISEASE WITHOUT ESOPHAGITIS: ICD-10-CM

## 2024-07-08 DIAGNOSIS — R32 URINARY INCONTINENCE, UNSPECIFIED TYPE: ICD-10-CM

## 2024-07-08 DIAGNOSIS — I89.0 LYMPHEDEMA: ICD-10-CM

## 2024-07-08 PROCEDURE — 1160F RVW MEDS BY RX/DR IN RCRD: CPT | Performed by: NURSE PRACTITIONER

## 2024-07-08 PROCEDURE — 1126F AMNT PAIN NOTED NONE PRSNT: CPT | Performed by: NURSE PRACTITIONER

## 2024-07-08 PROCEDURE — 3078F DIAST BP <80 MM HG: CPT | Performed by: NURSE PRACTITIONER

## 2024-07-08 PROCEDURE — 3074F SYST BP LT 130 MM HG: CPT | Performed by: NURSE PRACTITIONER

## 2024-07-08 PROCEDURE — 99349 HOME/RES VST EST MOD MDM 40: CPT | Performed by: NURSE PRACTITIONER

## 2024-07-08 PROCEDURE — 1159F MED LIST DOCD IN RCRD: CPT | Performed by: NURSE PRACTITIONER

## 2024-07-08 ASSESSMENT — PAIN SCALES - GENERAL: PAINLEVEL: 0-NO PAIN

## 2024-07-08 NOTE — PROGRESS NOTES
Subjective   Patient ID: Aleta Feliz is a 79 y.o. female who presents for Follow-up (Routine 2 month follow up).    Visit for 78 y/o female seen today in private home, accompanied by son Reynaldo and caregiver Linda for routine follow up of chronic medical conditions. PMHx of HTN, Hyperlipidemia, Hypothyroidism, PVD, Lymphedema, Overactive bladder, anemia, osteoporosis, constipation, GERD, Vascular dementia, insomnia, depression. Pt is sitting in wheelchair eating lunch. She is alert to self, place, disoriented to time. Pt is able to answer simple questions but is an overall poor historian secondary to dementia and most of the health history is provided by her son and caregiver. Pt lives in a single family home with her son. She has caregivers in the home 7 days per week from 10am-4pm and then from 6pm-9pm. She has limited mobility making it difficult to get out for medical appointments. Pt requires assistance with all ADLs and with meal preparation. Pt denies any appetite changes, signs of weight loss, abdominal pain, nausea, vomiting. Pt endorses occasional diarrhea, mostly depending on her diet. Her caregiver reports that this typically happens in the summer when she eats tomatoes or watermelon. Pt denies difficulty sleeping. Her caregivers assist with her medications. She receives all medications through [facility] in pill packs. There are no issues with compliance. Patient remains ambulatory with walker and stand by assistance. She denies recent fall or injury. Her caregiver reports that she has went to a few birthday parties and has been going outside in her wheelchair to sit by the pool. Pt  has HTN, PVD, Lymphedema. She continues to follow with cardiologist Dr. Edwards as needed. She denies headaches, dizziness, chest pain, palpitations.      Home Visit:          Medically necessary due to: Illness or condition that results in activity lmitation or restriction that impacts the ability to leave home  such as:, unsteady gait/poor balance, dementia/cognitive impairment         Current Outpatient Medications:     furosemide (Lasix) 40 mg tablet, TAKE 1 TABLET BY MOUTH TWICE A DAY, Disp: 56 tablet, Rfl: 11    potassium chloride CR 20 mEq ER tablet, TAKE 1 TABLET BY MOUTH DAILY, Disp: 28 tablet, Rfl: 11    acetaminophen (Tylenol 8 HOUR) 650 mg ER tablet, TAKE 2 TABLETS BY MOUTH EVERY TWELVE HOURS AS NEEDED, Disp: 120 tablet, Rfl: 11    ammonium lactate (Lac-Hydrin) 12 % lotion, Apply 1 Application topically if needed for dry skin., Disp: 225 g, Rfl: 1    ascorbic acid (Vitamin C) 500 mg tablet, TAKE 1 TABLET BY MOUTH DAILY, Disp: 28 tablet, Rfl: 11    cranberry fruit concentrate (AZO CRANBERRY ORAL), Take 1 tablet by mouth once daily in the evening., Disp: , Rfl:     donepezil (Aricept) 10 mg tablet, TAKE 1 TABLET BY MOUTH EVERY NIGHT AT BEDTIME, Disp: 28 tablet, Rfl: 11    FeroSuL tablet, TAKE 1 TABLET BY MOUTH THREE TIMES WEEKLY, Disp: 12 tablet, Rfl: 11    folic acid (Folvite) 800 mcg tablet, TAKE 1 TABLET BY MOUTH DAILY, Disp: 28 tablet, Rfl: 11    levothyroxine (Synthroid, Levoxyl) 50 mcg tablet, Take 1 tablet (50 mcg) by mouth once daily in the morning. Take before meals., Disp: , Rfl:     losartan (Cozaar) 50 mg tablet, TAKE 1 TABLET BY MOUTH DAILY, Disp: 28 tablet, Rfl: 11    memantine (Namenda) 10 mg tablet, TAKE 1 TABLET BY MOUTH TWICE A DAY, Disp: 56 tablet, Rfl: 11    MetamuciL 0.4 gram capsule, TAKE 3 CAPSULES BY MOUTH WITH 8 OZ OF LIQUID DAILY, Disp: 84 capsule, Rfl: 11    omeprazole (PriLOSEC) 20 mg DR capsule, TAKE 1 CAPSULE BY MOUTH 30 MINUTE(S) BEFORE MORNING MEAL, Disp: 30 capsule, Rfl: 11    oxybutynin XL (Ditropan-XL) 10 mg 24 hr tablet, Take 1 tablet (10 mg) by mouth once every 24 hours., Disp: , Rfl:     QUEtiapine (SEROquel) 25 mg tablet, TAKE 1/2 TABLET BY MOUTH EVERY NIGHT AT BEDTIME, Disp: 14 tablet, Rfl: 11    rosuvastatin (Crestor) 10 mg tablet, TAKE 1 TABLET BY MOUTH DAILY, Disp: 30  "tablet, Rfl: 11    vitamin B complex (Vitamins B Complex) tablet, TAKE 1 TABLET BY MOUTH DAILY, Disp: 28 tablet, Rfl: 11    Vitamin D3 50 mcg (2,000 unit) capsule, TAKE 1 CAPSULE BY MOUTH DAILY, Disp: 28 capsule, Rfl: 11     Review of Systems  Constitutional: Negative for appetite change, chills, fever, unexplained weight loss.   HENT: Negative for hearing loss and trouble swallowing.    Respiratory: Negative for shortness of breath, cough and wheezing.    Cardiovascular: Positive for edema. Negative for chest pain and palpitations.   Gastrointestinal: Positive for occasional diarrhea. Negative for abdominal pain, constipation, nausea and vomiting.   Endocrine: Positive for thyroid disease   Genitourinary: Positive for urinary incontinence   Musculoskeletal:  Positive for unsteady gait   Neurological: Positive for memory loss  Psychiatric/Behavioral:  The patient is not nervous/anxious.      Objective   /64 (BP Location: Right arm, Patient Position: Sitting, BP Cuff Size: Adult)   Pulse 66   Temp 36.3 °C (97.3 °F) (Temporal)   Resp 18   Ht 1.575 m (5' 2\")   Wt 88.5 kg (195 lb)   SpO2 100%   BMI 35.67 kg/m²     Physical Exam  Constitutional:       General: She is not in acute distress.     Appearance: She is overweight      Comments: Alert, Sitting in wheelchair   HENT:      Head: Normocephalic and atraumatic.      Nose: Nose normal.      Mouth/Throat:      Mouth: Mucous membranes are moist.      Pharynx: Oropharynx is clear.   Eyes:      Pupils: Pupils are equal, round, and reactive to light.  Cardiovascular:      Rate and Rhythm: Normal rate and regular rhythm.      Pulses: Normal pulses.      Heart sounds: Normal heart sounds. No murmur heard.     No friction rub. No gallop.      Comments: trace edema bilaterally, non pitting   Pulmonary:      Effort: Pulmonary effort is normal. No respiratory distress.      Breath sounds: Normal breath sounds. No wheezing, rhonchi or rales.   Abdominal:      General: " Bowel sounds are normal. There is no distension.      Palpations: Abdomen is soft.      Tenderness: There is no abdominal tenderness.   Musculoskeletal:      Cervical back: Neck supple.      Comments: Ambulatory with walker    Skin:     General: Skin is warm and dry.      Capillary Refill: Capillary refill takes less than 2 seconds.      Comments: stasis dermatitis bilaterally  Neurological:      General: No focal deficit present.      Mental Status: She is alert. Mental status is at baseline.      Gait: Abnormal       Comments: Alert to self, place. Disoriented to time.   Psychiatric:         Mood and Affect: Mood normal.         Behavior: Behavior normal.      Assessment/Plan   Diagnoses and all orders for this visit:  Primary hypertension  -chronic, vitals stable  -continue Losartan    Lymphedema  -chronic, LE stable  -continue furosemide    Gastroesophageal reflux disease without esophagitis  -chronic, stable, denies indigestion  -continue omeprazole     Urinary incontinence, unspecified type  -chronic, requires assistance with toileting  -no acute urinary concerns     Vascular dementia without behavioral disturbance (Multi)  -chronic, progressive  -no behavioral concerns reported  -continue memantine, donepezil, seroquel    Patient stable. Will continue house calls NP program as pt has limited mobility, difficulty getting out for medical appointments. Will update routine labs at next follow up appt. Advised pt/caregiver to contact house calls office with any acute concerns or medication needs.        Theresa Sanders, HANS-CNP

## 2024-09-09 ENCOUNTER — TELEPHONE (OUTPATIENT)
Dept: PRIMARY CARE | Facility: CLINIC | Age: 79
End: 2024-09-09
Payer: MEDICARE

## 2024-09-09 NOTE — TELEPHONE ENCOUNTER
9/10/24 House Calls visit with Theresa Sanders NP confirmed via phone with Reynaldo/ patient's son.

## 2024-09-10 ENCOUNTER — OFFICE VISIT (OUTPATIENT)
Dept: PRIMARY CARE | Facility: CLINIC | Age: 79
End: 2024-09-10
Payer: MEDICARE

## 2024-09-10 VITALS
OXYGEN SATURATION: 97 % | SYSTOLIC BLOOD PRESSURE: 110 MMHG | HEIGHT: 62 IN | TEMPERATURE: 97.1 F | RESPIRATION RATE: 18 BRPM | BODY MASS INDEX: 36.8 KG/M2 | WEIGHT: 200 LBS | HEART RATE: 81 BPM | DIASTOLIC BLOOD PRESSURE: 70 MMHG

## 2024-09-10 DIAGNOSIS — D50.9 IRON DEFICIENCY ANEMIA, UNSPECIFIED IRON DEFICIENCY ANEMIA TYPE: ICD-10-CM

## 2024-09-10 DIAGNOSIS — E78.5 HYPERLIPIDEMIA, UNSPECIFIED HYPERLIPIDEMIA TYPE: ICD-10-CM

## 2024-09-10 DIAGNOSIS — K21.9 GASTROESOPHAGEAL REFLUX DISEASE WITHOUT ESOPHAGITIS: ICD-10-CM

## 2024-09-10 DIAGNOSIS — F01.50 VASCULAR DEMENTIA WITHOUT BEHAVIORAL DISTURBANCE (MULTI): ICD-10-CM

## 2024-09-10 DIAGNOSIS — I89.0 LYMPHEDEMA: Primary | ICD-10-CM

## 2024-09-10 DIAGNOSIS — E55.9 VITAMIN D DEFICIENCY: ICD-10-CM

## 2024-09-10 DIAGNOSIS — I10 PRIMARY HYPERTENSION: ICD-10-CM

## 2024-09-10 DIAGNOSIS — E03.9 HYPOTHYROIDISM, UNSPECIFIED TYPE: ICD-10-CM

## 2024-09-10 PROCEDURE — 1126F AMNT PAIN NOTED NONE PRSNT: CPT | Performed by: NURSE PRACTITIONER

## 2024-09-10 PROCEDURE — 3078F DIAST BP <80 MM HG: CPT | Performed by: NURSE PRACTITIONER

## 2024-09-10 PROCEDURE — 1159F MED LIST DOCD IN RCRD: CPT | Performed by: NURSE PRACTITIONER

## 2024-09-10 PROCEDURE — 3074F SYST BP LT 130 MM HG: CPT | Performed by: NURSE PRACTITIONER

## 2024-09-10 PROCEDURE — 1160F RVW MEDS BY RX/DR IN RCRD: CPT | Performed by: NURSE PRACTITIONER

## 2024-09-10 PROCEDURE — 99349 HOME/RES VST EST MOD MDM 40: CPT | Performed by: NURSE PRACTITIONER

## 2024-09-10 ASSESSMENT — PAIN SCALES - GENERAL: PAINLEVEL: 0-NO PAIN

## 2024-09-10 NOTE — PROGRESS NOTES
Subjective   Patient ID: Aleta Feliz is a 79 y.o. female who presents for Follow-up (Routine 2 month follow up, labs).    Visit for 80 y/o female seen today in private home, accompanied by son Reynaldo and caregiver Linda for routine follow up. Pt is due for routine labs today. PMHx of HTN, Hyperlipidemia, Hypothyroidism, PVD, Lymphedema, Overactive bladder, anemia, osteoporosis, constipation, GERD, Vascular dementia, insomnia, depression. Pt is sitting in lift chair with legs elevated. She is alert to self, place, disoriented to time. She is able to answer simple questions but is an overall poor historian secondary to dementia and most of the health history is provided by her son and caregiver. Pt does appear very pleasant, will frequently laugh throughout conversation. She reports that she has been feeling well and does not have any acute concerns today. Pt lives in a single family home with her son Reynaldo. She has caregivers in the home 7 days per week from 10am-4pm and then from 6pm-9pm. Pt is ambulatory, short distances with her walker. She is able to stand and transfer without assistance but caregivers do not like pt to ambulate without stand by assistance as they worry she will fall. Pt requires assistance with all ADLs, medication management and with meal preparation. She denies any appetite changes, signs of weight loss, abdominal pain, nausea, vomiting. Pt endorses occasional diarrhea, mostly depending on her diet. Pt has HTN, PVD, Lymphedema. She continues to follow with cardiologist Dr. Edwards. She does not have a follow up scheduled at this time. Pt denies headaches, dizziness, chest pain, palpitations.      Home Visit:          Medically necessary due to: Illness or condition that results in activity lmitation or restriction that impacts the ability to leave home such as:, unsteady gait/poor balance, dementia/cognitive impairment         Current Outpatient Medications:     furosemide (Lasix) 40 mg  tablet, TAKE 1 TABLET BY MOUTH TWICE A DAY, Disp: 56 tablet, Rfl: 11    potassium chloride CR 20 mEq ER tablet, TAKE 1 TABLET BY MOUTH DAILY, Disp: 28 tablet, Rfl: 11    acetaminophen (Tylenol 8 HOUR) 650 mg ER tablet, TAKE 2 TABLETS BY MOUTH EVERY TWELVE HOURS AS NEEDED, Disp: 120 tablet, Rfl: 11    ammonium lactate (Lac-Hydrin) 12 % lotion, Apply 1 Application topically if needed for dry skin., Disp: 225 g, Rfl: 1    ascorbic acid (Vitamin C) 500 mg tablet, TAKE 1 TABLET BY MOUTH DAILY, Disp: 28 tablet, Rfl: 11    cranberry fruit concentrate (AZO CRANBERRY ORAL), Take 1 tablet by mouth once daily in the evening., Disp: , Rfl:     donepezil (Aricept) 10 mg tablet, TAKE 1 TABLET BY MOUTH EVERY NIGHT AT BEDTIME, Disp: 28 tablet, Rfl: 11    FeroSuL tablet, TAKE 1 TABLET BY MOUTH THREE TIMES WEEKLY, Disp: 12 tablet, Rfl: 11    folic acid (Folvite) 800 mcg tablet, TAKE 1 TABLET BY MOUTH DAILY, Disp: 28 tablet, Rfl: 11    levothyroxine (Synthroid, Levoxyl) 50 mcg tablet, Take 1 tablet (50 mcg) by mouth once daily in the morning. Take before meals., Disp: , Rfl:     losartan (Cozaar) 50 mg tablet, TAKE 1 TABLET BY MOUTH DAILY, Disp: 28 tablet, Rfl: 11    memantine (Namenda) 10 mg tablet, TAKE 1 TABLET BY MOUTH TWICE A DAY, Disp: 56 tablet, Rfl: 11    MetamuciL 0.4 gram capsule, TAKE 3 CAPSULES BY MOUTH WITH 8 OZ OF LIQUID DAILY, Disp: 84 capsule, Rfl: 11    omeprazole (PriLOSEC) 20 mg DR capsule, TAKE 1 CAPSULE BY MOUTH 30 MINUTE(S) BEFORE MORNING MEAL, Disp: 30 capsule, Rfl: 11    oxybutynin XL (Ditropan-XL) 10 mg 24 hr tablet, Take 1 tablet (10 mg) by mouth once every 24 hours., Disp: , Rfl:     QUEtiapine (SEROquel) 25 mg tablet, TAKE 1/2 TABLET BY MOUTH EVERY NIGHT AT BEDTIME, Disp: 14 tablet, Rfl: 11    rosuvastatin (Crestor) 10 mg tablet, TAKE 1 TABLET BY MOUTH DAILY, Disp: 30 tablet, Rfl: 11    vitamin B complex (Vitamins B Complex) tablet, TAKE 1 TABLET BY MOUTH DAILY, Disp: 28 tablet, Rfl: 11    Vitamin D3 50 mcg  "(2,000 unit) capsule, TAKE 1 CAPSULE BY MOUTH DAILY, Disp: 28 capsule, Rfl: 11     Review of Systems  Constitutional: Negative for appetite change, chills, fever, unexplained weight loss.   HENT: Negative for hearing loss and trouble swallowing.    Respiratory: Negative for shortness of breath, cough and wheezing.    Cardiovascular: Positive for edema. Negative for chest pain and palpitations.   Gastrointestinal: Positive for occasional diarrhea. Negative for abdominal pain, constipation, nausea and vomiting.   Endocrine: Positive for thyroid disease   Genitourinary: Positive for urinary incontinence   Musculoskeletal:  Positive for unsteady gait   Neurological: Positive for memory loss  Psychiatric/Behavioral: The patient is not nervous/anxious.       Objective   /70 (BP Location: Left arm, Patient Position: Sitting, BP Cuff Size: Large adult)   Pulse 81   Temp 36.2 °C (97.1 °F) (Temporal)   Resp 18   Ht 1.575 m (5' 2\")   Wt 90.7 kg (200 lb)   SpO2 97%   BMI 36.58 kg/m²     Physical Exam  Constitutional:       General: She is not in acute distress.     Appearance: She is overweight      Comments: Alert, Seen in recliner with legs elevated   HENT:      Head: Normocephalic and atraumatic.      Nose: Nose normal.      Mouth/Throat:      Mouth: Mucous membranes are moist.      Pharynx: Oropharynx is clear.   Eyes:      Pupils: Pupils are equal, round, and reactive to light.  Cardiovascular:      Rate and Rhythm: Normal rate and regular rhythm.      Pulses: Normal pulses.      Heart sounds: Normal heart sounds. No murmur heard.     No friction rub. No gallop.      Comments: trace edema bilaterally, non pitting   Pulmonary:      Effort: Pulmonary effort is normal. No respiratory distress.      Breath sounds: Normal breath sounds. No wheezing, rhonchi or rales.   Abdominal:      General: Bowel sounds are normal. There is no distension.      Palpations: Abdomen is soft.      Tenderness: There is no abdominal " tenderness.   Musculoskeletal:      Cervical back: Neck supple.      Comments: Ambulatory with walker    Skin:     General: Skin is warm and dry.      Capillary Refill: Capillary refill takes less than 2 seconds.      Comments: stasis dermatitis bilaterally  Neurological:      General: No focal deficit present.      Mental Status: She is alert. Mental status is at baseline.      Gait: Abnormal       Comments: Alert to self, place. Disoriented to time.   Psychiatric:         Mood and Affect: Mood normal.         Behavior: Behavior normal.     Assessment/Plan   Diagnoses and all orders for this visit:  Lymphedema  -chronic, minimal edema noted   -recommend using lymphedema pumps as needed  -continue Furosemide     Primary hypertension  -     CBC and Auto Differential; Future  -     Comprehensive metabolic panel; Future  -chronic, vitals stable  -continue Losartan  -recommend follow up with cardiology     Hyperlipidemia, unspecified hyperlipidemia type  -     Lipid panel; Future  -chronic, managed with Rosuvastatin     Hypothyroidism, unspecified type  -     Tsh With Reflex To Free T4 If Abnormal; Future  -chronic, managed with Levothyroxine     Gastroesophageal reflux disease without esophagitis  -chronic, stable, no current GI concerns  -continue omeprazole     Iron deficiency anemia, unspecified iron deficiency anemia type  -chronic, managed with routine iron     Vitamin D deficiency  -     Vitamin D 25-Hydroxy,Total (for eval of Vitamin D levels); Future  -chronic, managed with cholecalciferol     Vascular dementia without behavioral disturbance (Multi)   -chronic, mood stable, memory impairment noted  -continue donepezil, memantine, seroquel     Unable to obtain labs in home. Pt to go to outpatient lab next week with caregiver to have labs done.        Theresa Sanders, APRN-CNP

## 2024-09-26 ENCOUNTER — LAB (OUTPATIENT)
Dept: LAB | Facility: LAB | Age: 79
End: 2024-09-26
Payer: MEDICARE

## 2024-09-26 DIAGNOSIS — I10 PRIMARY HYPERTENSION: ICD-10-CM

## 2024-09-26 DIAGNOSIS — E03.9 HYPOTHYROIDISM, UNSPECIFIED TYPE: ICD-10-CM

## 2024-09-26 DIAGNOSIS — E55.9 VITAMIN D DEFICIENCY: ICD-10-CM

## 2024-09-26 DIAGNOSIS — F32.A DEPRESSION, UNSPECIFIED DEPRESSION TYPE: ICD-10-CM

## 2024-09-26 DIAGNOSIS — E78.5 HYPERLIPIDEMIA, UNSPECIFIED HYPERLIPIDEMIA TYPE: ICD-10-CM

## 2024-09-26 LAB
ALBUMIN SERPL BCP-MCNC: 4.1 G/DL (ref 3.4–5)
ALP SERPL-CCNC: 43 U/L (ref 33–136)
ALT SERPL W P-5'-P-CCNC: 7 U/L (ref 7–45)
ANION GAP SERPL CALC-SCNC: 14 MMOL/L (ref 10–20)
AST SERPL W P-5'-P-CCNC: 14 U/L (ref 9–39)
BASOPHILS # BLD AUTO: 0.05 X10*3/UL (ref 0–0.1)
BASOPHILS NFR BLD AUTO: 0.8 %
BILIRUB SERPL-MCNC: 0.4 MG/DL (ref 0–1.2)
BUN SERPL-MCNC: 32 MG/DL (ref 6–23)
CALCIUM SERPL-MCNC: 9.5 MG/DL (ref 8.6–10.3)
CHLORIDE SERPL-SCNC: 102 MMOL/L (ref 98–107)
CHOLEST SERPL-MCNC: 123 MG/DL (ref 0–199)
CHOLESTEROL/HDL RATIO: 2.7
CO2 SERPL-SCNC: 27 MMOL/L (ref 21–32)
CREAT SERPL-MCNC: 1.1 MG/DL (ref 0.5–1.05)
EGFRCR SERPLBLD CKD-EPI 2021: 51 ML/MIN/1.73M*2
EOSINOPHIL # BLD AUTO: 0.29 X10*3/UL (ref 0–0.4)
EOSINOPHIL NFR BLD AUTO: 4.7 %
ERYTHROCYTE [DISTWIDTH] IN BLOOD BY AUTOMATED COUNT: 12.8 % (ref 11.5–14.5)
GLUCOSE SERPL-MCNC: 147 MG/DL (ref 74–99)
HCT VFR BLD AUTO: 38.8 % (ref 36–46)
HDLC SERPL-MCNC: 45.5 MG/DL
HGB BLD-MCNC: 11.7 G/DL (ref 12–16)
IMM GRANULOCYTES # BLD AUTO: 0.01 X10*3/UL (ref 0–0.5)
IMM GRANULOCYTES NFR BLD AUTO: 0.2 % (ref 0–0.9)
LDLC SERPL CALC-MCNC: 52 MG/DL
LYMPHOCYTES # BLD AUTO: 1.72 X10*3/UL (ref 0.8–3)
LYMPHOCYTES NFR BLD AUTO: 27.7 %
MCH RBC QN AUTO: 30.4 PG (ref 26–34)
MCHC RBC AUTO-ENTMCNC: 30.2 G/DL (ref 32–36)
MCV RBC AUTO: 101 FL (ref 80–100)
MONOCYTES # BLD AUTO: 0.39 X10*3/UL (ref 0.05–0.8)
MONOCYTES NFR BLD AUTO: 6.3 %
NEUTROPHILS # BLD AUTO: 3.76 X10*3/UL (ref 1.6–5.5)
NEUTROPHILS NFR BLD AUTO: 60.3 %
NON HDL CHOLESTEROL: 78 MG/DL (ref 0–149)
NRBC BLD-RTO: 0 /100 WBCS (ref 0–0)
PLATELET # BLD AUTO: 308 X10*3/UL (ref 150–450)
POTASSIUM SERPL-SCNC: 3.5 MMOL/L (ref 3.5–5.3)
PROT SERPL-MCNC: 7 G/DL (ref 6.4–8.2)
RBC # BLD AUTO: 3.85 X10*6/UL (ref 4–5.2)
SODIUM SERPL-SCNC: 139 MMOL/L (ref 136–145)
TRIGL SERPL-MCNC: 126 MG/DL (ref 0–149)
TSH SERPL-ACNC: 1.99 MIU/L (ref 0.44–3.98)
VLDL: 25 MG/DL (ref 0–40)
WBC # BLD AUTO: 6.2 X10*3/UL (ref 4.4–11.3)

## 2024-09-26 PROCEDURE — 36415 COLL VENOUS BLD VENIPUNCTURE: CPT

## 2024-09-26 PROCEDURE — 82306 VITAMIN D 25 HYDROXY: CPT

## 2024-09-26 RX ORDER — LEVOTHYROXINE SODIUM 50 UG/1
50 TABLET ORAL
Qty: 28 TABLET | Refills: 11 | Status: SHIPPED | OUTPATIENT
Start: 2024-09-26

## 2024-09-26 RX ORDER — OXYBUTYNIN CHLORIDE 10 MG/1
10 TABLET, EXTENDED RELEASE ORAL DAILY
Qty: 28 TABLET | Refills: 11 | Status: SHIPPED | OUTPATIENT
Start: 2024-09-26

## 2024-09-27 LAB — 25(OH)D3 SERPL-MCNC: 92 NG/ML (ref 30–100)

## 2024-11-07 ENCOUNTER — TELEPHONE (OUTPATIENT)
Dept: PRIMARY CARE | Facility: CLINIC | Age: 79
End: 2024-11-07
Payer: MEDICARE

## 2024-11-07 NOTE — TELEPHONE ENCOUNTER
Patient returning call to office.  Insurance and address confirmed.  COVID Screening negative, verbal consent obtained while on the phone with this nurse.  Appointment CONFIRMED.

## 2024-11-08 ENCOUNTER — OFFICE VISIT (OUTPATIENT)
Dept: PRIMARY CARE | Facility: CLINIC | Age: 79
End: 2024-11-08
Payer: MEDICARE

## 2024-11-08 VITALS
HEIGHT: 62 IN | TEMPERATURE: 97.3 F | WEIGHT: 200 LBS | OXYGEN SATURATION: 95 % | SYSTOLIC BLOOD PRESSURE: 118 MMHG | BODY MASS INDEX: 36.8 KG/M2 | DIASTOLIC BLOOD PRESSURE: 62 MMHG | HEART RATE: 62 BPM | RESPIRATION RATE: 16 BRPM

## 2024-11-08 DIAGNOSIS — K21.9 GASTROESOPHAGEAL REFLUX DISEASE WITHOUT ESOPHAGITIS: ICD-10-CM

## 2024-11-08 DIAGNOSIS — D50.9 IRON DEFICIENCY ANEMIA, UNSPECIFIED IRON DEFICIENCY ANEMIA TYPE: ICD-10-CM

## 2024-11-08 DIAGNOSIS — F01.50 VASCULAR DEMENTIA WITHOUT BEHAVIORAL DISTURBANCE (MULTI): ICD-10-CM

## 2024-11-08 DIAGNOSIS — I89.0 LYMPHEDEMA: ICD-10-CM

## 2024-11-08 DIAGNOSIS — E03.9 HYPOTHYROIDISM, UNSPECIFIED TYPE: ICD-10-CM

## 2024-11-08 DIAGNOSIS — I10 PRIMARY HYPERTENSION: Primary | ICD-10-CM

## 2024-11-08 DIAGNOSIS — E78.5 HYPERLIPIDEMIA, UNSPECIFIED HYPERLIPIDEMIA TYPE: ICD-10-CM

## 2024-11-08 PROCEDURE — 3078F DIAST BP <80 MM HG: CPT | Performed by: NURSE PRACTITIONER

## 2024-11-08 PROCEDURE — 1126F AMNT PAIN NOTED NONE PRSNT: CPT | Performed by: NURSE PRACTITIONER

## 2024-11-08 PROCEDURE — 3074F SYST BP LT 130 MM HG: CPT | Performed by: NURSE PRACTITIONER

## 2024-11-08 PROCEDURE — 1159F MED LIST DOCD IN RCRD: CPT | Performed by: NURSE PRACTITIONER

## 2024-11-08 PROCEDURE — 1160F RVW MEDS BY RX/DR IN RCRD: CPT | Performed by: NURSE PRACTITIONER

## 2024-11-08 PROCEDURE — 99349 HOME/RES VST EST MOD MDM 40: CPT | Performed by: NURSE PRACTITIONER

## 2024-11-08 ASSESSMENT — PAIN SCALES - GENERAL: PAINLEVEL_OUTOF10: 0-NO PAIN

## 2024-11-08 NOTE — PROGRESS NOTES
Subjective   Patient ID: Aleta Feliz is a 79 y.o. female who presents for Follow-up (Routine 2 month follow up, chronic medical issues ).    Visit for 80 y/o female seen today in private home, accompanied by son Reynaldo and caregiver for routine follow up of chronic medical conditions. PMHx of HTN, Hyperlipidemia, Hypothyroidism, PVD, Lymphedema, Overactive bladder, anemia, osteoporosis, constipation, GERD, Vascular dementia, insomnia, depression. Pt is alert to self and place, disoriented to time. She is able to answer simple questions but is an overall poor historian 2/2 to dementia and most of the health history is supplemented by her patients son and caregiver. Pt lives in a single family home. Her son Reynaldo lives with her. Pt has caregivers in the home 7 days per week from 10am-4pm and then from 6pm-9pm and the caregivers assist with all ADLs and meal preparation. Patients son and caregivers manage her medications. There are no reported issues with compliance. Pt is ambulatory, short distances with her walker but does require stand by assistance. Pt denies recent fall or injury. She denies appetite changes, signs of weight loss, abdominal pain, nausea, vomiting. Pt denies bowel or bladder concerns today. She has HTN, PVD, Lymphedema. Her cardiologist is Dr. Edwards but patient does not have any follow up scheduled at this time. Pt denies headaches, dizziness, chest pain, palpitations. She does not use her lymphedema pumps as she reports that they cause loose bowel movements. She has not had any issues with increased edema. She denies any acute concerns today.      Home Visit:          Medically necessary due to: Illness or condition that results in activity lmitation or restriction that impacts the ability to leave home such as:, unsteady gait/poor balance, dementia/cognitive impairment         Current Outpatient Medications:     furosemide (Lasix) 40 mg tablet, TAKE 1 TABLET BY MOUTH TWICE A DAY, Disp: 56  tablet, Rfl: 11    potassium chloride CR 20 mEq ER tablet, TAKE 1 TABLET BY MOUTH DAILY, Disp: 28 tablet, Rfl: 11    acetaminophen (Tylenol 8 HOUR) 650 mg ER tablet, TAKE 2 TABLETS BY MOUTH EVERY TWELVE HOURS AS NEEDED, Disp: 120 tablet, Rfl: 11    ammonium lactate (Lac-Hydrin) 12 % lotion, Apply 1 Application topically if needed for dry skin., Disp: 225 g, Rfl: 1    ascorbic acid (Vitamin C) 500 mg tablet, TAKE 1 TABLET BY MOUTH DAILY, Disp: 28 tablet, Rfl: 11    cranberry fruit concentrate (AZO CRANBERRY ORAL), Take 1 tablet by mouth once daily in the evening., Disp: , Rfl:     donepezil (Aricept) 10 mg tablet, TAKE 1 TABLET BY MOUTH EVERY NIGHT AT BEDTIME, Disp: 28 tablet, Rfl: 11    FeroSuL tablet, TAKE 1 TABLET BY MOUTH THREE TIMES WEEKLY, Disp: 12 tablet, Rfl: 11    folic acid (Folvite) 800 mcg tablet, TAKE 1 TABLET BY MOUTH DAILY, Disp: 28 tablet, Rfl: 11    levothyroxine (Synthroid, Levoxyl) 50 mcg tablet, TAKE 1 TABLET BY MOUTH EVERY MORNING ON AN EMPTY STOMACH, Disp: 28 tablet, Rfl: 11    losartan (Cozaar) 50 mg tablet, TAKE 1 TABLET BY MOUTH DAILY, Disp: 28 tablet, Rfl: 11    memantine (Namenda) 10 mg tablet, TAKE 1 TABLET BY MOUTH TWICE A DAY, Disp: 56 tablet, Rfl: 11    MetamuciL 0.4 gram capsule, TAKE 3 CAPSULES BY MOUTH WITH 8 OZ OF LIQUID DAILY, Disp: 84 capsule, Rfl: 11    omeprazole (PriLOSEC) 20 mg DR capsule, TAKE 1 CAPSULE BY MOUTH 30 MINUTE(S) BEFORE MORNING MEAL, Disp: 30 capsule, Rfl: 11    oxybutynin XL (Ditropan-XL) 10 mg 24 hr tablet, TAKE 1 TABLET BY MOUTH DAILY, Disp: 28 tablet, Rfl: 11    QUEtiapine (SEROquel) 25 mg tablet, TAKE 1/2 TABLET BY MOUTH EVERY NIGHT AT BEDTIME, Disp: 14 tablet, Rfl: 11    rosuvastatin (Crestor) 10 mg tablet, TAKE 1 TABLET BY MOUTH DAILY, Disp: 30 tablet, Rfl: 11    vitamin B complex (Vitamins B Complex) tablet, TAKE 1 TABLET BY MOUTH DAILY, Disp: 28 tablet, Rfl: 11    Vitamin D3 50 mcg (2,000 unit) capsule, TAKE 1 CAPSULE BY MOUTH DAILY, Disp: 28 capsule,  "Rfl: 11     Review of Systems  Constitutional: Negative for appetite change, chills, fever, unexplained weight loss.   HENT: Negative for hearing loss and trouble swallowing.    Respiratory: Negative for shortness of breath, cough and wheezing.    Cardiovascular: Positive for edema, improved. Negative for chest pain and palpitations.   Gastrointestinal: Negative for abdominal pain, constipation, diarrhea, nausea and vomiting.   Endocrine: Positive for thyroid disease   Genitourinary: Positive for urinary incontinence   Musculoskeletal: Positive for unsteady gait, ambulatory with walker   Neurological: Positive for memory loss  Psychiatric/Behavioral: The patient is not nervous/anxious.      Objective   /62 (BP Location: Right arm, Patient Position: Sitting, BP Cuff Size: Large adult)   Pulse 62   Temp 36.3 °C (97.3 °F) (Temporal)   Resp 16   Ht 1.575 m (5' 2\")   Wt 90.7 kg (200 lb)   SpO2 95%   BMI 36.58 kg/m²     Physical Exam  Constitutional:       General: She is not in acute distress.     Appearance: She is overweight      Comments: Alert, Seen in recliner   HENT:      Head: Normocephalic and atraumatic.      Nose: Nose normal.      Mouth/Throat:      Mouth: Mucous membranes are moist.      Pharynx: Oropharynx is clear.   Eyes:      Pupils: Pupils are equal, round, and reactive to light.  Cardiovascular:      Rate and Rhythm: Normal rate and regular rhythm.      Pulses: Normal pulses.      Heart sounds: Normal heart sounds. No murmur heard.     No friction rub. No gallop.      Comments: trace edema bilaterally, non pitting   Pulmonary:      Effort: Pulmonary effort is normal. No respiratory distress.      Breath sounds: Normal breath sounds. No wheezing, rhonchi or rales.   Abdominal:      General: Bowel sounds are normal. There is no distension.      Palpations: Abdomen is soft.      Tenderness: There is no abdominal tenderness.   Musculoskeletal:      Cervical back: Neck supple.      Comments: " "Ambulatory with walker    Skin:     General: Skin is warm and dry.      Capillary Refill: Capillary refill takes less than 2 seconds.      Comments: stasis dermatitis bilaterally  Neurological:      General: No focal deficit present.      Mental Status: She is alert. Mental status is at baseline.      Gait: Abnormal       Comments: Alert to self, place. Disoriented to time.   Psychiatric:         Mood and Affect: Mood normal.         Behavior: Behavior normal.      Lab Results   Component Value Date    WBC 6.2 09/26/2024    HGB 11.7 (L) 09/26/2024    HCT 38.8 09/26/2024     (H) 09/26/2024     09/26/2024       Chemistry    Lab Results   Component Value Date/Time     09/26/2024 1303    K 3.5 09/26/2024 1303     09/26/2024 1303    CO2 27 09/26/2024 1303    BUN 32 (H) 09/26/2024 1303    CREATININE 1.10 (H) 09/26/2024 1303    Lab Results   Component Value Date/Time    CALCIUM 9.5 09/26/2024 1303    ALKPHOS 43 09/26/2024 1303    AST 14 09/26/2024 1303    ALT 7 09/26/2024 1303    BILITOT 0.4 09/26/2024 1303        Lab Results   Component Value Date    CHOL 123 09/26/2024    CHOL 140 07/20/2023    CHOL 124 11/25/2021     Lab Results   Component Value Date    HDL 45.5 09/26/2024    HDL 52 07/20/2023    HDL 47.0 11/25/2021     Lab Results   Component Value Date    LDLCALC 52 09/26/2024    LDLCALC 65 07/20/2023     Lab Results   Component Value Date    TRIG 126 09/26/2024    TRIG 114 07/20/2023    TRIG 112 11/25/2021     No components found for: \"CHOLHDL\"     Lab Results   Component Value Date    TSH 1.99 09/26/2024      Assessment/Plan   Diagnoses and all orders for this visit:  Primary hypertension  -chronic, vitals stable  -follows with cardiologist Dr. Edwards- recommend follow up if able   -continue Losartan     Lymphedema  -chronic, minimal edema noted on exam  -continue Furosemide   -encouraged use of lymphedema pumps if LE edema worsens     Hyperlipidemia, unspecified hyperlipidemia " type  -chronic, 9/26 lipid panel reviewed and stable  -continue Rosuvastatin     Iron deficiency anemia, unspecified iron deficiency anemia type  -chronic, September CBC reviewed and baseline for pt   -continue Ferrous Sulfate   -notify provider with any overt s/sx of bleeding    Gastroesophageal reflux disease without esophagitis  -chronic, stable, no current GI concerns   -continue Omeprazole     Hypothyroidism, unspecified type  -chronic, last TSH stable  -continue Levothyroxine     Vascular dementia without behavioral disturbance (Multi)  -chronic, memory impairment noted  -no recent behavioral concerns reported by son/caregivers   -continue donepezil, memantine, seroquel     Patient stable. Advised son/caregivers to contact house calls office with any acute concerns or medication needs.       Theresa Sanders, APRN-CNP

## 2024-12-30 ENCOUNTER — TELEPHONE (OUTPATIENT)
Dept: PRIMARY CARE | Facility: CLINIC | Age: 79
End: 2024-12-30
Payer: MEDICARE

## 2024-12-30 NOTE — TELEPHONE ENCOUNTER
Pt is ordered oxygen at night but does not like to wear it. They are asking for order to discontinue oxygen. I advised we would need visit and most likely testing to confirm pt is safe to not wear oxygen at night. Advised I would contact covering provider to confirm.

## 2025-01-22 DIAGNOSIS — G62.9 NEUROPATHY: ICD-10-CM

## 2025-01-22 DIAGNOSIS — E78.5 HYPERLIPIDEMIA, UNSPECIFIED HYPERLIPIDEMIA TYPE: ICD-10-CM

## 2025-01-22 DIAGNOSIS — K21.9 GASTROESOPHAGEAL REFLUX DISEASE WITHOUT ESOPHAGITIS: ICD-10-CM

## 2025-01-22 DIAGNOSIS — D50.9 IRON DEFICIENCY ANEMIA, UNSPECIFIED IRON DEFICIENCY ANEMIA TYPE: ICD-10-CM

## 2025-01-22 RX ORDER — DEXTROMETHORPHAN HYDROBROMIDE, GUAIFENESIN 5; 100 MG/5ML; MG/5ML
LIQUID ORAL
Qty: 112 TABLET | Refills: 11 | Status: SHIPPED | OUTPATIENT
Start: 2025-01-22

## 2025-01-22 RX ORDER — FERROUS SULFATE 325(65) MG
1 TABLET ORAL 3 TIMES WEEKLY
Qty: 12 TABLET | Refills: 11 | Status: SHIPPED | OUTPATIENT
Start: 2025-01-22

## 2025-01-22 RX ORDER — ROSUVASTATIN CALCIUM 10 MG/1
10 TABLET, COATED ORAL DAILY
Qty: 28 TABLET | Refills: 11 | Status: SHIPPED | OUTPATIENT
Start: 2025-01-22

## 2025-01-22 RX ORDER — OMEPRAZOLE 20 MG/1
CAPSULE, DELAYED RELEASE ORAL
Qty: 28 CAPSULE | Refills: 11 | Status: SHIPPED | OUTPATIENT
Start: 2025-01-22

## 2025-02-07 DIAGNOSIS — K59.09 OTHER CONSTIPATION: ICD-10-CM

## 2025-02-07 RX ORDER — PSYLLIUM HUSK 0.4 G
CAPSULE ORAL
Qty: 84 CAPSULE | Refills: 11 | Status: SHIPPED | OUTPATIENT
Start: 2025-02-07

## 2025-02-17 ENCOUNTER — TELEPHONE (OUTPATIENT)
Dept: PRIMARY CARE | Facility: CLINIC | Age: 80
End: 2025-02-17
Payer: MEDICARE

## 2025-02-18 ENCOUNTER — OFFICE VISIT (OUTPATIENT)
Dept: PRIMARY CARE | Facility: CLINIC | Age: 80
End: 2025-02-18
Payer: MEDICARE

## 2025-02-18 VITALS
BODY MASS INDEX: 35.15 KG/M2 | TEMPERATURE: 97.1 F | HEIGHT: 62 IN | HEART RATE: 65 BPM | WEIGHT: 191 LBS | RESPIRATION RATE: 16 BRPM | DIASTOLIC BLOOD PRESSURE: 68 MMHG | OXYGEN SATURATION: 99 % | SYSTOLIC BLOOD PRESSURE: 112 MMHG

## 2025-02-18 DIAGNOSIS — D50.9 IRON DEFICIENCY ANEMIA, UNSPECIFIED IRON DEFICIENCY ANEMIA TYPE: ICD-10-CM

## 2025-02-18 DIAGNOSIS — I89.0 LYMPHEDEMA: ICD-10-CM

## 2025-02-18 DIAGNOSIS — K21.9 GASTROESOPHAGEAL REFLUX DISEASE WITHOUT ESOPHAGITIS: ICD-10-CM

## 2025-02-18 DIAGNOSIS — F01.50 VASCULAR DEMENTIA WITHOUT BEHAVIORAL DISTURBANCE (MULTI): ICD-10-CM

## 2025-02-18 DIAGNOSIS — I10 PRIMARY HYPERTENSION: Primary | ICD-10-CM

## 2025-02-18 PROCEDURE — 3078F DIAST BP <80 MM HG: CPT | Performed by: NURSE PRACTITIONER

## 2025-02-18 PROCEDURE — 1159F MED LIST DOCD IN RCRD: CPT | Performed by: NURSE PRACTITIONER

## 2025-02-18 PROCEDURE — 1126F AMNT PAIN NOTED NONE PRSNT: CPT | Performed by: NURSE PRACTITIONER

## 2025-02-18 PROCEDURE — 3074F SYST BP LT 130 MM HG: CPT | Performed by: NURSE PRACTITIONER

## 2025-02-18 PROCEDURE — 99349 HOME/RES VST EST MOD MDM 40: CPT | Performed by: NURSE PRACTITIONER

## 2025-02-18 PROCEDURE — 1160F RVW MEDS BY RX/DR IN RCRD: CPT | Performed by: NURSE PRACTITIONER

## 2025-02-18 ASSESSMENT — PAIN SCALES - GENERAL: PAINLEVEL_OUTOF10: 0-NO PAIN

## 2025-02-18 NOTE — PROGRESS NOTES
"Subjective   Patient ID: Aleta Feliz is a 79 y.o. female who presents for Follow-up (Routine 3 month follow up).    Visit for 80 y/o female seen today in private home, accompanied by son Reynaldo and caregiver Linda for 3 month routine follow up. PMHx of HTN, Hyperlipidemia, Hypothyroidism, PVD, Lymphedema, Overactive bladder, anemia, osteoporosis, constipation, GERD, Vascular dementia, insomnia, depression. Pt is sitting on recliner with legs elevated. She will converse with provider but only if she is asked a question. Pt is alert to self and place, disoriented to time. Her son and caregiver will supplement the health history as patient is an overall poor historian 2/2 dementia. She lives in a single family home and her son Reynaldo lives with her. She does not drive and has limited mobility making it difficult to get out for medical appointments. Pt has caregivers in the home 7 days per week from 10am-4pm and then from 6pm-9pm and the caregivers assist with all ADLs and meal preparation. Her caregiver recently took patient out to a hair appointment and reports that patient did well and there were no issues. Pt has not had any recent falls or injuries. She remains ambulatory, short distances with her walker. Pt requires assistance with her medications. She receives dispill packs from [facility]. There are no reported issues with compliance. Pt requires assistance with meal preparation. She has been trying to eat healthier meals and has had weight loss since her last follow up. Pt denies appetite changes, abdominal pain, nausea, vomiting. Denies any acute bowel or bladder concerns. Pt has lymphedema. She denies any issues with worsening edema or shortness of breath. She has lymphedema pumps. Last used them about 3 weeks ago due to some increased edema but she has not had any further issues since. She does not use her pumps on a routine basis and she reports \"loose bowel movements\" afterwards. Pt has HTN, PVD. " Cardiologist is Dr. Edwards. She follows on an as needed basis due to her mobility, transportation concerns. Pt denies headaches, dizziness, chest pain, palpitations. She denies any acute concerns today.      Home Visit:          Medically necessary due to: Illness or condition that results in activity lmitation or restriction that impacts the ability to leave home such as:, unsteady gait/poor balance, dementia/cognitive impairment         Current Outpatient Medications:     furosemide (Lasix) 40 mg tablet, TAKE 1 TABLET BY MOUTH TWICE A DAY, Disp: 56 tablet, Rfl: 11    potassium chloride CR 20 mEq ER tablet, TAKE 1 TABLET BY MOUTH DAILY, Disp: 28 tablet, Rfl: 11    acetaminophen (Tylenol 8 HOUR) 650 mg ER tablet, TAKE 2 TABLETS BY MOUTH EVERY TWELVE HOURS AS NEEDED, Disp: 112 tablet, Rfl: 11    ammonium lactate (Lac-Hydrin) 12 % lotion, Apply 1 Application topically if needed for dry skin., Disp: 225 g, Rfl: 1    ascorbic acid (Vitamin C) 500 mg tablet, TAKE 1 TABLET BY MOUTH DAILY, Disp: 28 tablet, Rfl: 11    cranberry fruit concentrate (AZO CRANBERRY ORAL), Take 1 tablet by mouth once daily in the evening., Disp: , Rfl:     donepezil (Aricept) 10 mg tablet, TAKE 1 TABLET BY MOUTH EVERY NIGHT AT BEDTIME, Disp: 28 tablet, Rfl: 11    FeroSuL tablet, TAKE 1 TABLET BY MOUTH THREE TIMES WEEKLY, Disp: 12 tablet, Rfl: 11    folic acid (Folvite) 800 mcg tablet, TAKE 1 TABLET BY MOUTH DAILY, Disp: 28 tablet, Rfl: 11    levothyroxine (Synthroid, Levoxyl) 50 mcg tablet, TAKE 1 TABLET BY MOUTH EVERY MORNING ON AN EMPTY STOMACH, Disp: 28 tablet, Rfl: 11    losartan (Cozaar) 50 mg tablet, TAKE 1 TABLET BY MOUTH DAILY, Disp: 28 tablet, Rfl: 11    memantine (Namenda) 10 mg tablet, TAKE 1 TABLET BY MOUTH TWICE A DAY, Disp: 56 tablet, Rfl: 11    MetamuciL 0.4 gram capsule, TAKE 3 CAPSULES BY MOUTH WITH 8 OZ OF LIQUID DAILY, Disp: 84 capsule, Rfl: 11    omeprazole (PriLOSEC) 20 mg DR capsule, TAKE 1 CAPSULE BY MOUTH 30 MINUTE(S)  "BEFORE MORNING MEAL, Disp: 28 capsule, Rfl: 11    oxybutynin XL (Ditropan-XL) 10 mg 24 hr tablet, TAKE 1 TABLET BY MOUTH DAILY, Disp: 28 tablet, Rfl: 11    QUEtiapine (SEROquel) 25 mg tablet, TAKE 1/2 TABLET BY MOUTH EVERY NIGHT AT BEDTIME, Disp: 14 tablet, Rfl: 11    rosuvastatin (Crestor) 10 mg tablet, TAKE 1 TABLET BY MOUTH DAILY, Disp: 28 tablet, Rfl: 11    vitamin B complex (Vitamins B Complex) tablet, TAKE 1 TABLET BY MOUTH DAILY, Disp: 28 tablet, Rfl: 11    Vitamin D3 50 mcg (2,000 unit) capsule, TAKE 1 CAPSULE BY MOUTH DAILY, Disp: 28 capsule, Rfl: 11     Review of Systems  Constitutional: Positive for weight loss. Negative for appetite change, chills, fever.   HENT: Negative for hearing loss and trouble swallowing.    Respiratory: Negative for shortness of breath, cough and wheezing.    Cardiovascular: Positive for edema. Negative for chest pain and palpitations.   Gastrointestinal: Negative for abdominal pain, constipation, diarrhea, nausea and vomiting.   Endocrine: Positive for thyroid disease   Genitourinary: Positive for urinary incontinence   Musculoskeletal: Positive for unsteady gait, ambulatory with walker   Neurological: Positive for memory loss  Psychiatric/Behavioral: The patient is not nervous/anxious.       Objective   /68 (BP Location: Right arm, Patient Position: Sitting, BP Cuff Size: Adult)   Pulse 65   Temp 36.2 °C (97.1 °F) (Temporal)   Resp 16   Ht 1.575 m (5' 2\")   Wt 86.6 kg (191 lb)   SpO2 99%   BMI 34.93 kg/m²     Physical Exam  Constitutional:       General: She is not in acute distress.     Appearance: She is overweight      Comments: Alert, Seen in recliner with legs elevated   HENT:      Head: Normocephalic and atraumatic.      Nose: Nose normal.      Mouth/Throat:      Mouth: Mucous membranes are moist.      Pharynx: Oropharynx is clear.   Eyes:      Pupils: Pupils are equal, round, and reactive to light.  Cardiovascular:      Rate and Rhythm: Normal rate and " regular rhythm.      Pulses: Normal pulses.      Heart sounds: Normal heart sounds. No murmur heard.     No friction rub. No gallop.      Comments: trace edema bilaterally, non pitting   Pulmonary:      Effort: Pulmonary effort is normal. No respiratory distress.      Breath sounds: Normal breath sounds. No wheezing, rhonchi or rales.   Abdominal:      General: Bowel sounds are normal. There is no distension.      Palpations: Abdomen is soft.      Tenderness: There is no abdominal tenderness.   Musculoskeletal:      Cervical back: Neck supple.      Comments: Ambulatory with walker    Skin:     General: Skin is warm and dry.      Capillary Refill: Capillary refill takes less than 2 seconds.      Comments: stasis dermatitis bilaterally  Neurological:      General: No focal deficit present.      Mental Status: She is alert. Mental status is at baseline.      Gait: Abnormal       Comments: Alert to self, place. Disoriented to time.   Psychiatric:         Mood and Affect: Mood normal.         Behavior: Behavior normal.     Assessment/Plan   Diagnoses and all orders for this visit:  Primary hypertension  -chronic, vitals stable  -continue Losartan  -follow up with cardiology as needed     Lymphedema  -chronic, stable, LE edema (minimal) noted on exam  -continue Furosemide as prescribed   -use lymphedema pumps as needed     Iron deficiency anemia, unspecified iron deficiency anemia type  -chronic, managed with ferrous sulfate  -will repeat CBC at next follow up     Gastroesophageal reflux disease without esophagitis  -chronic, stable, no current GI concerns   -continue Omeprazole     Vascular dementia without behavioral disturbance (Multi)  -chronic, memory impairment noted   -requires assistance with all ADLs  -continue donepezil, memantine, seroquel     Patient stable. Will follow up with pt in 2 months with plans to update routine labs. Advised son/caregivers to contact house calls office with any acute concerns or  medication needs.       Theresa Sanders, APRN-CNP

## 2025-04-21 ENCOUNTER — TELEPHONE (OUTPATIENT)
Dept: PRIMARY CARE | Facility: CLINIC | Age: 80
End: 2025-04-21
Payer: MEDICARE

## 2025-04-21 NOTE — TELEPHONE ENCOUNTER
4/22/25 [facility] visit with Theresa Sanders NP confirmed via phone with Reynaldo/ patient's son.

## 2025-04-22 ENCOUNTER — OFFICE VISIT (OUTPATIENT)
Dept: PRIMARY CARE | Facility: CLINIC | Age: 80
End: 2025-04-22
Payer: MEDICARE

## 2025-04-22 VITALS
BODY MASS INDEX: 34.04 KG/M2 | SYSTOLIC BLOOD PRESSURE: 116 MMHG | HEIGHT: 62 IN | OXYGEN SATURATION: 97 % | TEMPERATURE: 97.1 F | DIASTOLIC BLOOD PRESSURE: 72 MMHG | HEART RATE: 66 BPM | RESPIRATION RATE: 16 BRPM | WEIGHT: 185 LBS

## 2025-04-22 DIAGNOSIS — I89.0 LYMPHEDEMA: ICD-10-CM

## 2025-04-22 DIAGNOSIS — R73.01 ELEVATED FASTING GLUCOSE: ICD-10-CM

## 2025-04-22 DIAGNOSIS — D50.9 IRON DEFICIENCY ANEMIA, UNSPECIFIED IRON DEFICIENCY ANEMIA TYPE: ICD-10-CM

## 2025-04-22 DIAGNOSIS — E03.9 HYPOTHYROIDISM, UNSPECIFIED TYPE: ICD-10-CM

## 2025-04-22 DIAGNOSIS — F01.50 VASCULAR DEMENTIA WITHOUT BEHAVIORAL DISTURBANCE (MULTI): ICD-10-CM

## 2025-04-22 DIAGNOSIS — K21.9 GASTROESOPHAGEAL REFLUX DISEASE WITHOUT ESOPHAGITIS: Primary | ICD-10-CM

## 2025-04-22 DIAGNOSIS — N39.41 URGE INCONTINENCE OF URINE: ICD-10-CM

## 2025-04-22 DIAGNOSIS — I10 PRIMARY HYPERTENSION: ICD-10-CM

## 2025-04-22 PROCEDURE — 3078F DIAST BP <80 MM HG: CPT | Performed by: NURSE PRACTITIONER

## 2025-04-22 PROCEDURE — 1160F RVW MEDS BY RX/DR IN RCRD: CPT | Performed by: NURSE PRACTITIONER

## 2025-04-22 PROCEDURE — 1126F AMNT PAIN NOTED NONE PRSNT: CPT | Performed by: NURSE PRACTITIONER

## 2025-04-22 PROCEDURE — 99349 HOME/RES VST EST MOD MDM 40: CPT | Performed by: NURSE PRACTITIONER

## 2025-04-22 PROCEDURE — 1159F MED LIST DOCD IN RCRD: CPT | Performed by: NURSE PRACTITIONER

## 2025-04-22 PROCEDURE — 3074F SYST BP LT 130 MM HG: CPT | Performed by: NURSE PRACTITIONER

## 2025-04-22 ASSESSMENT — PAIN SCALES - GENERAL: PAINLEVEL_OUTOF10: 0-NO PAIN

## 2025-04-22 NOTE — PROGRESS NOTES
Subjective   Patient ID: Aleta Feliz is a 79 y.o. female who presents for Follow-up (Routine 2 month follow up, chronic medical issues, labs).    Visit for 78 y/o female seen today in private home, accompanied by son Reynaldo and caregiver Linda for routine follow up of multiple medical conditions. PMHx of HTN, Hyperlipidemia, Hypothyroidism, PVD, Lymphedema, Overactive bladder, anemia, osteoporosis, constipation, GERD, Vascular dementia, insomnia, depression. Pt is due for routine labs. She prefers to go to outpatient lab for testing. Her caregiver is planning to take her to the local lab next week.     Pt is sitting on recliner this afternoon with legs dependent. She is alert, eating lunch. Pt is able to answer simple questions but typically only converses with provider if she is asked a specific question. Pt is alert to self and place, disoriented to time. She has dementia and is an overall poor historian regarding her health. Her family and caregiver will supplement the health history as needed. Pt lives in a single family home. Her son Reynaldo lives with her. Pt does not drive anymore and rarely leaves the home but she does occasionally go outside in the wheelchair if the weather is nice. Pt has caregivers 7 days per week from 10am-4pm and then from 6pm-9pm daily. Her caregivers assist with all ADLs and meal preparation. Pt requires assistance with her medications. She receives dispill packs from [facility]. Caregivers and son makes sure she takes them as prescribed. Pt denies appetite changes. She continues to have weight loss. This is intentional. Caregiver reports that patient is eating healthier meals and has been snacking less often, and is trying to lose some weight. Pt denies abdominal pain, nausea, vomiting. She denies any bowel or bladder concerns. Pt remains ambulatory, short distances with her walker. She is trying to walk more in the house. No reported falls or injury. Pt has lymphedema but does  not routinely use her lymphedema pumps. Pt denies any recent issues with edema or increased shortness of breath. She has HTN, PVD. Her cardiologist is Dr. Edwards. Pt has not had any recent follow up appointments. She tells me that she follows as needed. Pt denies headaches, dizziness, chest pain, palpitations. Pt denies any acute concerns today. She reports that she is doing well. She has not had any recent hospitalizations. Caregiver and son deny any acute concerns.      Home Visit:          Medically necessary due to: Illness or condition that results in activity lmitation or restriction that impacts the ability to leave home such as:, unsteady gait/poor balance, dementia/cognitive impairment         Current Outpatient Medications:     furosemide (Lasix) 40 mg tablet, TAKE 1 TABLET BY MOUTH TWICE A DAY, Disp: 56 tablet, Rfl: 11    potassium chloride CR 20 mEq ER tablet, TAKE 1 TABLET BY MOUTH DAILY, Disp: 28 tablet, Rfl: 11    acetaminophen (Tylenol 8 HOUR) 650 mg ER tablet, TAKE 2 TABLETS BY MOUTH EVERY TWELVE HOURS AS NEEDED, Disp: 112 tablet, Rfl: 11    ammonium lactate (Lac-Hydrin) 12 % lotion, Apply 1 Application topically if needed for dry skin., Disp: 225 g, Rfl: 1    ascorbic acid (Vitamin C) 500 mg tablet, TAKE 1 TABLET BY MOUTH DAILY, Disp: 28 tablet, Rfl: 11    cranberry fruit concentrate (AZO CRANBERRY ORAL), Take 1 tablet by mouth once daily in the evening., Disp: , Rfl:     donepezil (Aricept) 10 mg tablet, TAKE 1 TABLET BY MOUTH EVERY NIGHT AT BEDTIME, Disp: 28 tablet, Rfl: 11    FeroSuL tablet, TAKE 1 TABLET BY MOUTH THREE TIMES WEEKLY, Disp: 12 tablet, Rfl: 11    folic acid (Folvite) 800 mcg tablet, TAKE 1 TABLET BY MOUTH DAILY, Disp: 28 tablet, Rfl: 11    levothyroxine (Synthroid, Levoxyl) 50 mcg tablet, TAKE 1 TABLET BY MOUTH EVERY MORNING ON AN EMPTY STOMACH, Disp: 28 tablet, Rfl: 11    losartan (Cozaar) 50 mg tablet, TAKE 1 TABLET BY MOUTH DAILY, Disp: 28 tablet, Rfl: 11    memantine  "(Namenda) 10 mg tablet, TAKE 1 TABLET BY MOUTH TWICE A DAY, Disp: 56 tablet, Rfl: 11    MetamuciL 0.4 gram capsule, TAKE 3 CAPSULES BY MOUTH WITH 8 OZ OF LIQUID DAILY, Disp: 84 capsule, Rfl: 11    omeprazole (PriLOSEC) 20 mg DR capsule, TAKE 1 CAPSULE BY MOUTH 30 MINUTE(S) BEFORE MORNING MEAL, Disp: 28 capsule, Rfl: 11    oxybutynin XL (Ditropan-XL) 10 mg 24 hr tablet, TAKE 1 TABLET BY MOUTH DAILY, Disp: 28 tablet, Rfl: 11    QUEtiapine (SEROquel) 25 mg tablet, TAKE 1/2 TABLET BY MOUTH EVERY NIGHT AT BEDTIME, Disp: 14 tablet, Rfl: 11    rosuvastatin (Crestor) 10 mg tablet, TAKE 1 TABLET BY MOUTH DAILY, Disp: 28 tablet, Rfl: 11    vitamin B complex (Vitamins B Complex) tablet, TAKE 1 TABLET BY MOUTH DAILY, Disp: 28 tablet, Rfl: 11    Vitamin D3 50 mcg (2,000 unit) capsule, TAKE 1 CAPSULE BY MOUTH DAILY, Disp: 28 capsule, Rfl: 11     Review of Systems  Constitutional: Positive for weight loss, intentional. Negative for appetite change, chills, fever.   HENT: Negative for hearing loss and trouble swallowing.    Respiratory: Negative for shortness of breath, cough and wheezing.    Cardiovascular: Positive for edema. Negative for chest pain and palpitations.   Gastrointestinal: Negative for abdominal pain, constipation, diarrhea, nausea and vomiting.   Endocrine: Positive for thyroid disease   Genitourinary: Positive for urinary incontinence   Musculoskeletal: Positive for unsteady gait, ambulatory with walker   Neurological: Positive for memory loss.   Psychiatric/Behavioral: The patient is not nervous/anxious.      Objective   /72 (BP Location: Left arm, Patient Position: Sitting, BP Cuff Size: Large adult)   Pulse 66   Temp 36.2 °C (97.1 °F) (Temporal)   Resp 16   Ht 1.575 m (5' 2\")   Wt 83.9 kg (185 lb)   SpO2 97%   BMI 33.84 kg/m²     Physical Exam  Constitutional:       General: She is not in acute distress.     Appearance: She is overweight      Comments: Alert, Seen in recliner with legs elevated "   HENT:      Head: Normocephalic and atraumatic.      Nose: Nose normal.      Mouth/Throat:      Mouth: Mucous membranes are moist.      Pharynx: Oropharynx is clear.   Eyes:      Pupils: Pupils are equal, round, and reactive to light.  Cardiovascular:      Rate and Rhythm: Normal rate and regular rhythm.      Pulses: Normal pulses.      Heart sounds: Normal heart sounds. No murmur heard.     No friction rub. No gallop.      Comments: trace edema bilaterally, non pitting   Pulmonary:      Effort: Pulmonary effort is normal. No respiratory distress.      Breath sounds: Normal breath sounds. No wheezing, rhonchi or rales.   Abdominal:      General: Bowel sounds are normal. There is no distension.      Palpations: Abdomen is soft.      Tenderness: There is no abdominal tenderness.   Musculoskeletal:      Cervical back: Neck supple.      Comments: Ambulatory with walker    Skin:     General: Skin is warm and dry.      Capillary Refill: Capillary refill takes less than 2 seconds.      Comments: stasis dermatitis bilaterally  Neurological:      General: No focal deficit present.      Mental Status: She is alert. Mental status is at baseline.      Gait: Abnormal       Comments: Alert to self, place. Disoriented to time.   Psychiatric:         Mood and Affect: Mood normal.         Behavior: Behavior normal.     Assessment/Plan   Diagnoses and all orders for this visit:  Gastroesophageal reflux disease without esophagitis  -chronic, stable, well managed with Omeprazole    Iron deficiency anemia, unspecified iron deficiency anemia type  -chronic, last CBC with H/H 11.7 / 38.8  -pt denies any bleeding concerns   -continue ferrous sulfate 3 times weekly     Hypothyroidism, unspecified type  -     Tsh With Reflex To Free T4 If Abnormal; Future  -chronic, managed with Levothyroxine     Primary hypertension  -     CBC and Auto Differential; Future  -     Basic metabolic panel; Future  -chronic, vitals stable, well managed with  Losartan  -recommend cardiology follow up    Lymphedema  -chronic, stable, no edema on exam  -continue Furosemide  -pt to use lymphedema pumps as needed     Urge incontinence of urine  -chronic, intermittent, recommend toileting at least every 2 hours to minimize incontinence     Elevated fasting glucose  -     Hemoglobin A1c; Future    Vascular dementia without behavioral disturbance (Multi)   -chronic, memory impairment noted, requires assistance with all ADLs  -continue donepezil, memantine, seroquel   -monitor for behaviors, increased agitation in the evenings     Routine labs ordered in [facility]. Pt to go to outpatient lab at [facility] at her earliest convenience.        Theresa Sanders, APRN-CNP

## 2025-05-02 DIAGNOSIS — I89.0 LYMPHEDEMA: ICD-10-CM

## 2025-05-04 RX ORDER — POTASSIUM CHLORIDE 20 MEQ/1
20 TABLET, EXTENDED RELEASE ORAL DAILY
Qty: 28 TABLET | Refills: 11 | Status: SHIPPED | OUTPATIENT
Start: 2025-05-04

## 2025-05-04 RX ORDER — FUROSEMIDE 40 MG/1
40 TABLET ORAL 2 TIMES DAILY
Qty: 56 TABLET | Refills: 11 | Status: SHIPPED | OUTPATIENT
Start: 2025-05-04

## 2025-06-04 DIAGNOSIS — F02.80 ALZHEIMER'S DISEASE, UNSPECIFIED: ICD-10-CM

## 2025-06-04 DIAGNOSIS — E55.9 VITAMIN D DEFICIENCY: ICD-10-CM

## 2025-06-04 DIAGNOSIS — D50.9 IRON DEFICIENCY ANEMIA, UNSPECIFIED IRON DEFICIENCY ANEMIA TYPE: ICD-10-CM

## 2025-06-04 DIAGNOSIS — I10 PRIMARY HYPERTENSION: ICD-10-CM

## 2025-06-04 DIAGNOSIS — G30.9 ALZHEIMER'S DISEASE, UNSPECIFIED: ICD-10-CM

## 2025-06-04 RX ORDER — QUETIAPINE FUMARATE 25 MG/1
TABLET, FILM COATED ORAL
Qty: 14 TABLET | Refills: 11 | Status: SHIPPED | OUTPATIENT
Start: 2025-06-04

## 2025-06-04 RX ORDER — MULTIVIT WITH MINERALS/HERBS
1 TABLET ORAL DAILY
Qty: 28 TABLET | Refills: 11 | Status: SHIPPED | OUTPATIENT
Start: 2025-06-04

## 2025-06-04 RX ORDER — MEMANTINE HYDROCHLORIDE 10 MG/1
10 TABLET ORAL 2 TIMES DAILY
Qty: 56 TABLET | Refills: 11 | Status: SHIPPED | OUTPATIENT
Start: 2025-06-04

## 2025-06-04 RX ORDER — DONEPEZIL HYDROCHLORIDE 10 MG/1
10 TABLET, FILM COATED ORAL NIGHTLY
Qty: 28 TABLET | Refills: 11 | Status: SHIPPED | OUTPATIENT
Start: 2025-06-04

## 2025-06-04 RX ORDER — LOSARTAN POTASSIUM 50 MG/1
50 TABLET ORAL DAILY
Qty: 28 TABLET | Refills: 11 | Status: SHIPPED | OUTPATIENT
Start: 2025-06-04

## 2025-06-04 RX ORDER — FOLIC ACID 0.8 MG
800 TABLET ORAL DAILY
Qty: 28 TABLET | Refills: 11 | Status: SHIPPED | OUTPATIENT
Start: 2025-06-04

## 2025-06-04 RX ORDER — ACETAMINOPHEN 500 MG
50 TABLET ORAL DAILY
Qty: 28 CAPSULE | Refills: 11 | Status: SHIPPED | OUTPATIENT
Start: 2025-06-04

## 2025-06-04 RX ORDER — ASCORBIC ACID 500 MG
500 TABLET ORAL DAILY
Qty: 28 TABLET | Refills: 11 | Status: SHIPPED | OUTPATIENT
Start: 2025-06-04

## 2025-07-18 ENCOUNTER — TELEPHONE (OUTPATIENT)
Dept: PRIMARY CARE | Facility: CLINIC | Age: 80
End: 2025-07-18
Payer: MEDICARE

## 2025-07-21 ENCOUNTER — OFFICE VISIT (OUTPATIENT)
Dept: PRIMARY CARE | Facility: CLINIC | Age: 80
End: 2025-07-21
Payer: MEDICARE

## 2025-07-21 VITALS
DIASTOLIC BLOOD PRESSURE: 68 MMHG | TEMPERATURE: 97 F | RESPIRATION RATE: 16 BRPM | HEART RATE: 72 BPM | SYSTOLIC BLOOD PRESSURE: 114 MMHG | WEIGHT: 190 LBS | OXYGEN SATURATION: 97 % | HEIGHT: 62 IN | BODY MASS INDEX: 34.96 KG/M2

## 2025-07-21 DIAGNOSIS — F01.50 VASCULAR DEMENTIA WITHOUT BEHAVIORAL DISTURBANCE (MULTI): ICD-10-CM

## 2025-07-21 DIAGNOSIS — E03.9 ACQUIRED HYPOTHYROIDISM: ICD-10-CM

## 2025-07-21 DIAGNOSIS — I10 PRIMARY HYPERTENSION: Primary | ICD-10-CM

## 2025-07-21 DIAGNOSIS — I89.0 LYMPHEDEMA: ICD-10-CM

## 2025-07-21 DIAGNOSIS — R26.81 UNSTEADY GAIT WHEN WALKING: ICD-10-CM

## 2025-07-21 DIAGNOSIS — K21.9 GASTROESOPHAGEAL REFLUX DISEASE WITHOUT ESOPHAGITIS: ICD-10-CM

## 2025-07-21 PROCEDURE — 3074F SYST BP LT 130 MM HG: CPT | Performed by: NURSE PRACTITIONER

## 2025-07-21 PROCEDURE — 3078F DIAST BP <80 MM HG: CPT | Performed by: NURSE PRACTITIONER

## 2025-07-21 PROCEDURE — 1160F RVW MEDS BY RX/DR IN RCRD: CPT | Performed by: NURSE PRACTITIONER

## 2025-07-21 PROCEDURE — 1126F AMNT PAIN NOTED NONE PRSNT: CPT | Performed by: NURSE PRACTITIONER

## 2025-07-21 PROCEDURE — 99349 HOME/RES VST EST MOD MDM 40: CPT | Performed by: NURSE PRACTITIONER

## 2025-07-21 PROCEDURE — 36415 COLL VENOUS BLD VENIPUNCTURE: CPT | Performed by: NURSE PRACTITIONER

## 2025-07-21 PROCEDURE — 1159F MED LIST DOCD IN RCRD: CPT | Performed by: NURSE PRACTITIONER

## 2025-07-21 ASSESSMENT — ENCOUNTER SYMPTOMS
DEPRESSION: 0
NAUSEA: 0
HEMATURIA: 0
UNEXPECTED WEIGHT CHANGE: 0
LOSS OF SENSATION IN FEET: 0
SLEEP DISTURBANCE: 0
BRUISES/BLEEDS EASILY: 0
DYSURIA: 0
TROUBLE SWALLOWING: 0
PALPITATIONS: 0
FATIGUE: 0
SPEECH DIFFICULTY: 0
FEVER: 0
CHEST TIGHTNESS: 0
ARTHRALGIAS: 1
DYSPHORIC MOOD: 0
SORE THROAT: 0
DIZZINESS: 0
CHILLS: 0
SHORTNESS OF BREATH: 0
VOMITING: 0
DIFFICULTY URINATING: 0
OCCASIONAL FEELINGS OF UNSTEADINESS: 0
COUGH: 0
CONSTIPATION: 0
LIGHT-HEADEDNESS: 0
SINUS PRESSURE: 0
WHEEZING: 0
DIARRHEA: 0
APPETITE CHANGE: 0
ABDOMINAL PAIN: 0
WOUND: 0
NERVOUS/ANXIOUS: 0

## 2025-07-21 ASSESSMENT — PAIN SCALES - GENERAL: PAINLEVEL_OUTOF10: 0-NO PAIN

## 2025-07-21 NOTE — PROGRESS NOTES
Subjective   Patient ID: Aleta Feliz is a 80 y.o. female who presents for Follow-up (Routine 3 month follow up, chronic medical conditions).    Visit for 81 y/o female seen today in private home, accompanied by her son Reynaldo and caregiver Linda for 3 month routine follow up of chronic medical conditions.     PMHx of HTN, Hyperlipidemia, Hypothyroidism, PVD, Lymphedema, Overactive bladder, anemia, osteoporosis, constipation, GERD, Vascular dementia, insomnia, depression, unsteady gait.      Pt is sitting on recliner this afternoon, legs elevated. She is watching TV. Pt is alert to self and place, disoriented to time. She is able to answer simple questions but typically only converses with provider if she is spoken to. Pt will frequently look at her caregiver when asked questions about her health but she does try and converse and will mostly answer with one or two word responses. Pt is an overall poor historian 2/2 dementia so most of the health history is supplemented by patients son, caregiver and her medical chart. Pt lives in a single family home. Her son Reynaldo lives with her. Pt does not drive and rarely leaves the house. She has limited mobility, remains ambulatory short distances with her walker but reports it to be difficult to get out for medical appointments. Pt has caregivers daily from 10am-4pm and then from 6pm-9pm in the evenings. Her caregivers assist with all ADLs, medication management and meal preparation. Pt denies appetite changes, abdominal pain, nausea, vomiting. She was trying to lose weight, eating healthier foods. Weight has stabilized over the past 2-3 months. Pt denies any bowel or bladder concerns. Pt has HTN, PVD. Cardiologist is Dr. Edwards but has not had any recent follow up appointments. Pt denies headaches, dizziness, chest pain, palpitations. She has lymphedema. Does not routinely use her pumps. Denies any recent issues with increased edema or weight gain. Pt is overdue for lab  work. Her caregiver reports that she took patient out to the outpatient lab at [facility] and they were unable to obtain the blood work.      Home Visit:          Medically necessary due to: Illness or condition that results in activity lmitation or restriction that impacts the ability to leave home such as:, unsteady gait/poor balance, dementia/cognitive impairment         Current Outpatient Medications:     acetaminophen (Tylenol 8 HOUR) 650 mg ER tablet, TAKE 2 TABLETS BY MOUTH EVERY TWELVE HOURS AS NEEDED, Disp: 112 tablet, Rfl: 11    ammonium lactate (Lac-Hydrin) 12 % lotion, Apply 1 Application topically if needed for dry skin., Disp: 225 g, Rfl: 1    ascorbic acid (Vitamin C) 500 mg tablet, TAKE 1 TABLET BY MOUTH DAILY, Disp: 28 tablet, Rfl: 11    cholecalciferol (Vitamin D-3) 50 mcg (2,000 units) capsule, TAKE 1 CAPSULE BY MOUTH DAILY, Disp: 28 capsule, Rfl: 11    cranberry fruit concentrate (AZO CRANBERRY ORAL), Take 1 tablet by mouth once daily in the evening., Disp: , Rfl:     donepezil (Aricept) 10 mg tablet, TAKE 1 TABLET BY MOUTH EVERY NIGHT AT BEDTIME, Disp: 28 tablet, Rfl: 11    FeroSuL tablet, TAKE 1 TABLET BY MOUTH THREE TIMES WEEKLY, Disp: 12 tablet, Rfl: 11    folic acid (Folvite) 800 mcg tablet, TAKE 1 TABLET BY MOUTH DAILY, Disp: 28 tablet, Rfl: 11    furosemide (Lasix) 40 mg tablet, TAKE 1 TABLET BY MOUTH TWICE A DAY, Disp: 56 tablet, Rfl: 11    levothyroxine (Synthroid, Levoxyl) 50 mcg tablet, TAKE 1 TABLET BY MOUTH EVERY MORNING ON AN EMPTY STOMACH, Disp: 28 tablet, Rfl: 11    losartan (Cozaar) 50 mg tablet, TAKE 1 TABLET BY MOUTH DAILY, Disp: 28 tablet, Rfl: 11    memantine (Namenda) 10 mg tablet, TAKE 1 TABLET BY MOUTH TWICE A DAY, Disp: 56 tablet, Rfl: 11    MetamuciL 0.4 gram capsule, TAKE 3 CAPSULES BY MOUTH WITH 8 OZ OF LIQUID DAILY, Disp: 84 capsule, Rfl: 11    omeprazole (PriLOSEC) 20 mg DR capsule, TAKE 1 CAPSULE BY MOUTH 30 MINUTE(S) BEFORE MORNING MEAL, Disp: 28 capsule, Rfl: 11     "oxybutynin XL (Ditropan-XL) 10 mg 24 hr tablet, TAKE 1 TABLET BY MOUTH DAILY, Disp: 28 tablet, Rfl: 11    potassium chloride CR 20 mEq ER tablet, TAKE 1 TABLET BY MOUTH DAILY, Disp: 28 tablet, Rfl: 11    QUEtiapine (SEROquel) 25 mg tablet, TAKE 1/2 TABLET BY MOUTH EVERY NIGHT AT BEDTIME, Disp: 14 tablet, Rfl: 11    rosuvastatin (Crestor) 10 mg tablet, TAKE 1 TABLET BY MOUTH DAILY, Disp: 28 tablet, Rfl: 11    vitamin B complex tablet, TAKE 1 TABLET BY MOUTH DAILY, Disp: 28 tablet, Rfl: 11     Review of Systems   Constitutional:  Negative for appetite change, chills, fatigue, fever and unexpected weight change.   HENT:  Negative for congestion, hearing loss, sinus pressure, sore throat and trouble swallowing.    Eyes:  Negative for visual disturbance.   Respiratory:  Negative for cough, chest tightness, shortness of breath and wheezing.    Cardiovascular:  Positive for leg swelling (\"comes and goes\"). Negative for chest pain and palpitations.   Gastrointestinal:  Negative for abdominal pain, constipation, diarrhea, nausea and vomiting.   Genitourinary:  Negative for difficulty urinating, dysuria and hematuria.        Positive for incontinence    Musculoskeletal:  Positive for arthralgias (\"pain all over my body\") and gait problem.   Skin:  Negative for rash and wound.   Neurological:  Negative for dizziness, speech difficulty and light-headedness.        Positive for memory loss   Hematological:  Does not bruise/bleed easily.   Psychiatric/Behavioral:  Negative for dysphoric mood and sleep disturbance. The patient is not nervous/anxious.      Objective   /68 (BP Location: Right arm, Patient Position: Sitting, BP Cuff Size: Adult)   Pulse 72   Temp 36.1 °C (97 °F) (Temporal)   Resp 16   Ht 1.575 m (5' 2\")   Wt 86.2 kg (190 lb)   SpO2 97%   BMI 34.75 kg/m²     Physical Exam  Constitutional:       General: She is not in acute distress.     Appearance: She is overweight      Comments: Alert, Seen in recliner " with legs elevated   HENT:      Head: Normocephalic and atraumatic.      Nose: Nose normal.      Mouth/Throat:      Mouth: Mucous membranes are moist.      Pharynx: Oropharynx is clear.   Eyes:      Pupils: Pupils are equal, round, and reactive to light.  Cardiovascular:      Rate and Rhythm: Normal rate and regular rhythm.      Pulses: Normal pulses.      Heart sounds: Normal heart sounds. No murmur heard.     No friction rub. No gallop.      Comments: trace edema bilaterally, non pitting   Pulmonary:      Effort: Pulmonary effort is normal. No respiratory distress.      Breath sounds: Normal breath sounds. No wheezing, rhonchi or rales.   Abdominal:      General: Bowel sounds are normal. There is no distension.      Palpations: Abdomen is soft.      Tenderness: There is no abdominal tenderness.   Musculoskeletal:      Cervical back: Neck supple.      Comments: Ambulatory with walker    Skin:     General: Skin is warm and dry.      Capillary Refill: Capillary refill takes less than 2 seconds.      Comments: stasis dermatitis bilaterally, legs tender to touch  Neurological:      General: No focal deficit present.      Mental Status: She is alert. Mental status is at baseline.      Gait: Abnormal       Comments: Alert to self, place. Disoriented to time.   Psychiatric:         Mood and Affect: Mood normal.         Behavior: Behavior normal.     Assessment/Plan   Diagnoses and all orders for this visit:  Primary hypertension  -chronic, vitals stable  -continue Losartan 50 mg daily  -recommend cardiology follow up     Lymphedema  -chronic, minimal LE edema noted   -continue Furosemide 50 mg BID, Potassium 20 meq daily     Gastroesophageal reflux disease without esophagitis  -chronic, stable, no current GI concerns   -continue Omeprazole 20 mg daily     Acquired hypothyroidism  -chronic, managed with Levothyroxine 50 mcg daily     Vascular dementia without behavioral disturbance (Multi)  -chronic, memory impairment  noted   -no recent behaviors reported   -continue Donepezil 10 mg at HS, memantine 10 mg BID, Quetiapine 25 mg- 1/2 tablet at bedtime     Unsteady gait when walking  -chronic, recommend using walker when ambulating to minimize fall risk     Patient stable. Seems to be doing well overall.     Routine labs obtained in home- previously ordered in April- CBC, BMP, TSH, A1c level- pt tolerated well     Advised patient, son Reynaldo and caregiver Linda to contact house calls office with any acute concerns or medication needs.        Theresa Sanders, APRN-CNP

## 2025-07-22 LAB
ANION GAP SERPL CALCULATED.4IONS-SCNC: 13 MMOL/L (CALC) (ref 7–17)
BASOPHILS # BLD AUTO: 53 CELLS/UL (ref 0–200)
BASOPHILS NFR BLD AUTO: 0.8 %
BUN SERPL-MCNC: 41 MG/DL (ref 7–25)
BUN/CREAT SERPL: 33 (CALC) (ref 6–22)
CALCIUM SERPL-MCNC: 9.5 MG/DL (ref 8.6–10.4)
CHLORIDE SERPL-SCNC: 106 MMOL/L (ref 98–110)
CO2 SERPL-SCNC: 24 MMOL/L (ref 20–32)
CREAT SERPL-MCNC: 1.25 MG/DL (ref 0.6–0.95)
EGFRCR SERPLBLD CKD-EPI 2021: 43 ML/MIN/1.73M2
EOSINOPHIL # BLD AUTO: 251 CELLS/UL (ref 15–500)
EOSINOPHIL NFR BLD AUTO: 3.8 %
ERYTHROCYTE [DISTWIDTH] IN BLOOD BY AUTOMATED COUNT: 13 % (ref 11–15)
EST. AVERAGE GLUCOSE BLD GHB EST-MCNC: 114 MG/DL
EST. AVERAGE GLUCOSE BLD GHB EST-SCNC: 6.3 MMOL/L
GLUCOSE SERPL-MCNC: 98 MG/DL (ref 65–99)
HBA1C MFR BLD: 5.6 %
HCT VFR BLD AUTO: 37.9 % (ref 35–45)
HGB BLD-MCNC: 12 G/DL (ref 11.7–15.5)
LYMPHOCYTES # BLD AUTO: 1762 CELLS/UL (ref 850–3900)
LYMPHOCYTES NFR BLD AUTO: 26.7 %
MCH RBC QN AUTO: 32 PG (ref 27–33)
MCHC RBC AUTO-ENTMCNC: 31.7 G/DL (ref 32–36)
MCV RBC AUTO: 101.1 FL (ref 80–100)
MONOCYTES # BLD AUTO: 462 CELLS/UL (ref 200–950)
MONOCYTES NFR BLD AUTO: 7 %
NEUTROPHILS # BLD AUTO: 4072 CELLS/UL (ref 1500–7800)
NEUTROPHILS NFR BLD AUTO: 61.7 %
PLATELET # BLD AUTO: 302 THOUSAND/UL (ref 140–400)
PMV BLD REES-ECKER: 10.7 FL (ref 7.5–12.5)
POTASSIUM SERPL-SCNC: 3.9 MMOL/L (ref 3.5–5.3)
RBC # BLD AUTO: 3.75 MILLION/UL (ref 3.8–5.1)
SODIUM SERPL-SCNC: 143 MMOL/L (ref 135–146)
TSH SERPL-ACNC: 1.41 MIU/L (ref 0.4–4.5)
WBC # BLD AUTO: 6.6 THOUSAND/UL (ref 3.8–10.8)

## 2025-08-21 DIAGNOSIS — F32.A DEPRESSION, UNSPECIFIED DEPRESSION TYPE: ICD-10-CM

## 2025-08-21 DIAGNOSIS — E03.9 HYPOTHYROIDISM, UNSPECIFIED TYPE: ICD-10-CM

## 2025-08-24 RX ORDER — OXYBUTYNIN CHLORIDE 10 MG/1
10 TABLET, EXTENDED RELEASE ORAL DAILY
Qty: 28 TABLET | Refills: 11 | Status: SHIPPED | OUTPATIENT
Start: 2025-08-24

## 2025-08-24 RX ORDER — LEVOTHYROXINE SODIUM 50 UG/1
50 TABLET ORAL
Qty: 28 TABLET | Refills: 11 | Status: SHIPPED | OUTPATIENT
Start: 2025-08-24